# Patient Record
Sex: MALE | Race: WHITE | NOT HISPANIC OR LATINO | Employment: FULL TIME | URBAN - METROPOLITAN AREA
[De-identification: names, ages, dates, MRNs, and addresses within clinical notes are randomized per-mention and may not be internally consistent; named-entity substitution may affect disease eponyms.]

---

## 2017-01-02 ENCOUNTER — OFFICE VISIT (OUTPATIENT)
Dept: DERMATOLOGY | Facility: CLINIC | Age: 22
End: 2017-01-02

## 2017-01-02 VITALS
DIASTOLIC BLOOD PRESSURE: 62 MMHG | BODY MASS INDEX: 22.51 KG/M2 | RESPIRATION RATE: 64 BRPM | WEIGHT: 152 LBS | SYSTOLIC BLOOD PRESSURE: 132 MMHG | HEIGHT: 69 IN

## 2017-01-02 DIAGNOSIS — L70.0 ACNE VULGARIS: ICD-10-CM

## 2017-01-02 DIAGNOSIS — M35.9 AUTOIMMUNE DISEASE (H): ICD-10-CM

## 2017-01-02 DIAGNOSIS — L63.9 AA (ALOPECIA AREATA): Primary | ICD-10-CM

## 2017-01-02 DIAGNOSIS — E55.9 VITAMIN D DEFICIENCY: ICD-10-CM

## 2017-01-02 DIAGNOSIS — L65.9 LOSS OF HAIR: ICD-10-CM

## 2017-01-02 RX ORDER — SULFACETAMIDE SODIUM, SULFUR 100; 50 MG/G; MG/G
LOTION TOPICAL
Qty: 1 BOTTLE | Refills: 11 | Status: SHIPPED | OUTPATIENT
Start: 2017-01-02 | End: 2023-09-19

## 2017-01-02 ASSESSMENT — PAIN SCALES - GENERAL: PAINLEVEL: NO PAIN (0)

## 2017-01-02 NOTE — LETTER
"1/2/2017       RE: Dariusz Marshall  [address] [place] [address]  [place] [address]     Dear Colleague,    Thank you for referring your patient, Dariusz Marshall, to the [facility] DERMATOLOGY at [facility]. Please see a copy of my visit note below.    [facility] Dermatology Note    Dermatology Problem List:  1. Alopecia areata  -current treatment: 3 cc ILK 10mg/cc, alternating clobetasol 0.05% shampoo, and ketoconazole shampoo daily, and Rogaine 5% foam daily  -last labs checked 8/30/16: iron studies wnl (ferritin 56), vitamin D wnl at 33.    2. Acne vulgaris  -current treatment: sulfacetamide/sulfur wash daily, and Tazorac, prn to acneiform papules    3. History of hypovitaminosis D  -vitamin D supplementation 2000 IU daily    Encounter Date: Jan 2, 2017    CC:  Chief Complaint    Patient presents with       Derm Problem        Patient comes to clinic today for hairloss. Small amount of new hair growth. Per pt \"seems worse\"       History of Present Illness:  Mr. Dariusz Marshall is a 21 year old male who presents as a follow-up for alopecia areata. The patient was last seen 10/17/206 when he received 3 cc  Kenalog 10 injected into the scalp. Patient is currently treating his alopecia areata with alternation of  clobetasol 0.05% shampoo and ketoconazole 2% shampoo daily, Rogaine 5% foam daily, and Vitamin D supplementation (2000 IU daily) given history of hypovitaminosis D. Patient had labs checked at his last visit (8/30/16). His iron studies were all within normal limits (ferritin 56), and vitamin D was normal at 33.       Since his last visit, patient reports that his AA has been worsening with regard to the number of alopecic patches, but that he has noticed regrowth of fine hairs in most of the areas, \"there are a lot more spots than last time...it was worse but I think it has been growing in spots.\" He does not think he has any hair loss in his beard (\"my beard is fine\") or " elsewhere on his body. Patient denies any scalp burning, itching, or pain.  No excess hair growth present on unwanted areas of the body.      For acne treatment, patient uses sulfacetamide/sulfur wash daily and Tazorac, prn. He states that his acne is well controlled on his current regime. Patient denies any changes in medications or significant past medical history since his last visit.      Past Medical History:    Patient Active Problem List    Diagnosis       AA (alopecia areata)       Focal hyperhidrosis       Autoimmune disease (H)       Acne vulgaris       Vitamin D deficiency       Past Medical History     History reviewed. No pertinent past medical history.      Past Surgical History     History reviewed. No pertinent past surgical history.       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     Medications:   Current Outpatient Prescriptions     Current Outpatient Prescriptions    Medication  Sig  Dispense  Refill       ketoconazole (NIZORAL) 2 % shampoo  Apply to scalp, lather, leave on for 5 minutes, then rinse.  120 mL  4       clobetasol propionate (CLOBEX) 0.05 % SHAM  Apply to scalp five days of the week  1 Bottle  1       sulfacetamide sodium-sulfur 10-5 % LOTN  Use daily as directed  1 Bottle  11       aluminum chloride (DRYSOL) 20 % external solution  To improve effect, cover area of application with plastic wrap,  hold in place with tight shirt, and wash area in morning. As sweating improves, decrease use to 1-2 times weekly.  60 mL  3       Tazarotene (TAZORAC) 0.05 % CREA  User every other night at first as tolerated and then can advance to daily at night.  30 g  11       Sulfacetamide-Sulfur-Cleanser (ROSADERM) 10-5 % KIT  Externally apply 1 Applicatorful topically daily  1 kit  11       Minoxidil (ROGAINE MENS) 5 % FOAM  Apply to scalp daily after showering  1 Can  11       clindamycin (CLEOCIN T) 1 % lotion  Apply  topically daily. Apply to  face in morning.  60 mL  12         No Known Allergies  Review of Systems:  -As per HPI    Physical exam:  GEN: This is a well developed, well-nourished male in no acute distress, in a pleasant mood.     SKIN: Focused examination of the face, scalp, and fingernails was performed.  -Patches of alopecia with short, fine, hypopigmented fibers present within the circles of alopecia on vertex scalp, occipital scalp, postauricular scalp bilaterally, and bilateral temporoparietal scalp. There is excellent regrowth on the right side of the head with some thinning on the left side, but overall decrease in size of the patches  -Patch of alopecia present on R beard region, minimal hair regrowth in this area  - Scattered healing papules on the bilateral cheeks in the beard region    -Minimal scalp erythema (inter or delmy-follicular) or scale present   -Few scattered acneiform papules on chin and malar cheeks bilaterally  -No patches of hair loss on eyebrows or eyelashes bilaterally    -No evidence of nail pitting or onycholysis present on exam  -No other lesions of concern on areas examined.      Impression/Plan:  1. Alopecia areata: stable since last visit.  Areas of regrowth present along temporoparietal scalp and midline central scalp.  Patches of alopecia persist on occipital, vertex, and L beard region.      Kenalog intralesional injection procedure note: After verbal consent and discussion of risks including but not limited to atrophy, pain, and bruising, cleansing with isopropyl alcohol, time out was performed, 3 total cc of Kenalog 10 mg/cc was injected into approximately 30 sites  on the scalp.  The patient tolerated the procedure well and left the Dermatology clinic in good condition.    Continue alternating clobetasol 0.05% shampoo and ketoconazole 2% shampoo daily    Continue to apply Rogaine 5% foam daily to patches of alopecia    Continue Vit D supplementation 2000 IU daily given history of hypovitaminosis  D    Photographs reviewed       2. Acne vulgaris: clinically improved from previous visits.  Few scattered acneiform papules on chin and malar cheeks bilaterally.    Continue to use sulfacetamide/sulfur wash daily    Continue to apply Tazorac cream, prn to affected areas    Follow-up in 2 months, earlier for new or changing lesions.                CC Dr. Guerra on close of this encounter.    Dr. Madrid staffed the patient.    Staff Involved:  Resident(Amelia Peralta)/Staff(as above)      Patient was seen and examined with the medicine/dermatology resident. I agree with the history, review of systems, physical examination, assessments and plan. ILK injections were completed together.     Nelly Madrid MD  Professor and  Chair  Department of Dermatology  [facility]

## 2017-01-02 NOTE — NURSING NOTE
"  Chief Complaint   Patient presents with     Hair/Scalp Problem     Alopecia.Small amount of new hair growth. Per pt \"seems worse\"          Hazel Smith RN     "

## 2017-01-02 NOTE — PATIENT INSTRUCTIONS
Dr. Madrid's Clinic Follow-up:    If we are unable to schedule your appointment today, then you will receive a personal call from our clinic staff to schedule with Dr. Madrid prior to your expected return.    Please, contact us with any questions via telephone.  We are not using [employer] to schedule appointments for this clinic at this the present time    How do I call with questions?       [facility]: [phone]       For urgent needs outside of business hours call the [facility] at [phone]        and ask for the dermatology resident on call

## 2017-01-02 NOTE — MR AVS SNAPSHOT
After Visit Summary   1/2/2017    Dariusz Marshall    MRN: [id number]           Patient Information     Date Of Birth          1995        Visit Information        Provider Department      1/2/2017 7:30 AM Nelly Madrid MD [facility] Dermatology        Today's Diagnoses     AA (alopecia areata)    -  1     Acne vulgaris         Loss of hair         Autoimmune disease (H)           Care Instructions    Dr. Madrid's Clinic Follow-up:    If we are unable to schedule your appointment today, then you will receive a personal call from our clinic staff to schedule with Dr. Madrid prior to your expected return.    Please, contact us with any questions via telephone.  We are not using [employer] to schedule appointments for this clinic at this the present time    How do I call with questions?       [facility]: [phone]       For urgent needs outside of business hours call the [facility] at [phone]        and ask for the dermatology resident on call            Follow-ups after your visit        Follow-up notes from your care team     Return in about 2 months (around 3/2/2017) for Alopecia areata f/u.      Who to contact     Please call your clinic at [phone] to:    Ask questions about your health    Make or cancel appointments    Discuss your medicines    Learn about your test results    Speak to your doctor   If you have compliments or concerns about an experience at your clinic, or if you wish to file a complaint, please contact [facility] Patient Relations at [phone] or email us at [email or web address]         Additional Information About Your Visit        [employer] Information     [employer] gives you secure access to your electronic health record. If you see a primary care provider, you can also send messages to your care team and make appointments. If you have questions, please call your primary care  "clinic.  If you do not have a primary care provider, please call [phone] and they will assist you.      [employer] is an electronic gateway that provides easy, online access to your medical records. With [employer], you can request a clinic appointment, read your test results, renew a prescription or communicate with your care team.     To access your existing account, please contact your [facility] or call [phone] for assistance.        Your Vitals Were     Respirations Height BMI (Body Mass Index)             64 1.76 m (5' 9.29\") 22.26 kg/m2          Blood Pressure from Last 3 Encounters:   01/02/17 132/62   05/23/16 137/85   04/10/14 119/67    Weight from Last 3 Encounters:   01/02/17 68.947 kg (152 lb)   04/10/14 70.3 kg (154 lb 15.7 oz) (53.73 %*)   08/26/13 69 kg (152 lb 1.9 oz) (53.14 %*)     * Growth percentiles are based on [facility] 2-20 Years data.              Today, you had the following     No orders found for display       Primary Care Provider Office Phone # Fax #    Justin Guerra [phone]       [facility] [address] [place] [id number]        Thank you!     Thank you for choosing [facility] DERMATOLOGY  for your care. Our goal is always to provide you with excellent care. Hearing back from our patients is one way we can continue to improve our services. Please take a few minutes to complete the written survey that you may receive in the mail after your visit with us. Thank you!             Your Updated Medication List - Protect others around you: Learn how to safely use, store and throw away your medicines at www.disposemymeds.org.          This list is accurate as of: 1/2/17  8:08 AM.  Always use your most recent med list.                   Brand Name Dispense Instructions for use    aluminum chloride 20 % external solution    DRYSOL    60 mL    To improve effect, cover area of application with plastic wrap,  hold in place with tight shirt, " and wash area in morning. As sweating improves, decrease use to 1-2 times weekly.       clindamycin 1 % lotion    CLEOCIN T    60 mL    Apply  topically daily. Apply to face in morning.       clobetasol propionate 0.05 % Sham    CLOBEX    1 Bottle    Apply to scalp five days of the week       ketoconazole 2 % shampoo    NIZORAL    120 mL    Apply to scalp, lather, leave on for 5 minutes, then rinse.       Minoxidil 5 % Foam    ROGAINE MENS    1 Can    Apply to scalp daily after showering       ROSADERM 10-5 % Kit     1 kit    Externally apply 1 Applicatorful topically daily       sulfacetamide sodium-sulfur 10-5 % Lotn     1 Bottle    Use daily as directed       tazarotene 0.05 % Crea cream    TAZORAC    30 g    User every other night at first as tolerated and then can advance to daily at night.

## 2017-01-02 NOTE — PROGRESS NOTES
"[facility] Dermatology Note    Dermatology Problem List:  1. Alopecia areata  -current treatment: 3 cc ILK 10mg/cc, alternating clobetasol 0.05% shampoo, and ketoconazole shampoo daily, and Rogaine 5% foam daily  -last labs checked 8/30/16: iron studies wnl (ferritin 56), vitamin D wnl at 33.    2. Acne vulgaris  -current treatment: sulfacetamide/sulfur wash daily, and Tazorac, prn to acneiform papules    3. History of hypovitaminosis D  -vitamin D supplementation 2000 IU daily    Encounter Date: Jan 2, 2017    CC:  Chief Complaint    Patient presents with       Derm Problem        Patient comes to clinic today for hairloss. Small amount of new hair growth. Per pt \"seems worse\"       History of Present Illness:  Mr. Dariusz Marshall is a 21 year old male who presents as a follow-up for alopecia areata. The patient was last seen 10/17/206 when he received 3 cc  Kenalog 10 injected into the scalp. Patient is currently treating his alopecia areata with alternation of  clobetasol 0.05% shampoo and ketoconazole 2% shampoo daily, Rogaine 5% foam daily, and Vitamin D supplementation (2000 IU daily) given history of hypovitaminosis D. Patient had labs checked at his last visit (8/30/16). His iron studies were all within normal limits (ferritin 56), and vitamin D was normal at 33.       Since his last visit, patient reports that his AA has been worsening with regard to the number of alopecic patches, but that he has noticed regrowth of fine hairs in most of the areas, \"there are a lot more spots than last time...it was worse but I think it has been growing in spots.\" He does not think he has any hair loss in his beard (\"my beard is fine\") or elsewhere on his body. Patient denies any scalp burning, itching, or pain.  No excess hair growth present on unwanted areas of the body.      For acne treatment, patient uses sulfacetamide/sulfur wash daily and Tazorac, prn. He states that his acne is well controlled on his " current regime. Patient denies any changes in medications or significant past medical history since his last visit.      Past Medical History:    Patient Active Problem List    Diagnosis       AA (alopecia areata)       Focal hyperhidrosis       Autoimmune disease (H)       Acne vulgaris       Vitamin D deficiency       Past Medical History     History reviewed. No pertinent past medical history.      Past Surgical History     History reviewed. No pertinent past surgical history.       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     Medications:   Current Outpatient Prescriptions     Current Outpatient Prescriptions    Medication  Sig  Dispense  Refill       ketoconazole (NIZORAL) 2 % shampoo  Apply to scalp, lather, leave on for 5 minutes, then rinse.  120 mL  4       clobetasol propionate (CLOBEX) 0.05 % SHAM  Apply to scalp five days of the week  1 Bottle  1       sulfacetamide sodium-sulfur 10-5 % LOTN  Use daily as directed  1 Bottle  11       aluminum chloride (DRYSOL) 20 % external solution  To improve effect, cover area of application with plastic wrap,  hold in place with tight shirt, and wash area in morning. As sweating improves, decrease use to 1-2 times weekly.  60 mL  3       Tazarotene (TAZORAC) 0.05 % CREA  User every other night at first as tolerated and then can advance to daily at night.  30 g  11       Sulfacetamide-Sulfur-Cleanser (ROSADERM) 10-5 % KIT  Externally apply 1 Applicatorful topically daily  1 kit  11       Minoxidil (ROGAINE MENS) 5 % FOAM  Apply to scalp daily after showering  1 Can  11       clindamycin (CLEOCIN T) 1 % lotion  Apply  topically daily. Apply to face in morning.  60 mL  12         No Known Allergies  Review of Systems:  -As per HPI    Physical exam:  GEN: This is a well developed, well-nourished male in no acute distress, in a pleasant mood.     SKIN: Focused examination of the face, scalp, and fingernails was  performed.  -Patches of alopecia with short, fine, hypopigmented fibers present within the circles of alopecia on vertex scalp, occipital scalp, postauricular scalp bilaterally, and bilateral temporoparietal scalp. There is excellent regrowth on the right side of the head with some thinning on the left side, but overall decrease in size of the patches  -Patch of alopecia present on R beard region, minimal hair regrowth in this area  - Scattered healing papules on the bilateral cheeks in the beard region    -Minimal scalp erythema (inter or delmy-follicular) or scale present   -Few scattered acneiform papules on chin and malar cheeks bilaterally  -No patches of hair loss on eyebrows or eyelashes bilaterally    -No evidence of nail pitting or onycholysis present on exam  -No other lesions of concern on areas examined.      Impression/Plan:  1. Alopecia areata: stable since last visit.  Areas of regrowth present along temporoparietal scalp and midline central scalp.  Patches of alopecia persist on occipital, vertex, and L beard region.      Kenalog intralesional injection procedure note: After verbal consent and discussion of risks including but not limited to atrophy, pain, and bruising, cleansing with isopropyl alcohol, time out was performed, 3 total cc of Kenalog 10 mg/cc was injected into approximately 30 sites  on the scalp.  The patient tolerated the procedure well and left the Dermatology clinic in good condition.    Continue alternating clobetasol 0.05% shampoo and ketoconazole 2% shampoo daily    Continue to apply Rogaine 5% foam daily to patches of alopecia    Continue Vit D supplementation 2000 IU daily given history of hypovitaminosis D    Photographs reviewed       2. Acne vulgaris: clinically improved from previous visits.  Few scattered acneiform papules on chin and malar cheeks bilaterally.    Continue to use sulfacetamide/sulfur wash daily    Continue to apply Tazorac cream, prn to affected  areas    Follow-up in 2 months, earlier for new or changing lesions.                CC Dr. Guerra on close of this encounter.    Dr. Madrid staffed the patient.    Staff Involved:  Resident(Amelia Peralta)/Staff(as above)      Patient was seen and examined with the medicine/dermatology resident. I agree with the history, review of systems, physical examination, assessments and plan. ILK injections were completed together.     Nelly Madrid MD  Professor and  Chair  Department of Dermatology  [facility]

## 2017-01-02 NOTE — NURSING NOTE
Drug Administration Record    Drug Name: triamcinolone acetonide(kenalog)  Dose: 3mL of triamcinolone 10mg/mL, 30mg dose  Route administered: ID  NDC #: Kenalog-10 ([address]-89)  Amount of waste(mL):2mL  Reason for waste: As per MD

## 2017-01-24 DIAGNOSIS — L63.9 AA (ALOPECIA AREATA): Primary | ICD-10-CM

## 2017-01-24 RX ORDER — CLOBETASOL PROPIONATE 0.05 G/100ML
SHAMPOO TOPICAL
Qty: 118 ML | Refills: 3 | Status: SHIPPED | OUTPATIENT
Start: 2017-01-24 | End: 2023-09-16

## 2017-01-24 NOTE — TELEPHONE ENCOUNTER
Last seen 1/2/17:  1. Alopecia areata: stable since last visit.  Areas of regrowth present along temporoparietal scalp and midline central scalp.  Patches of alopecia persist on occipital, vertex, and L beard region.      Kenalog intralesional injection procedure note: After verbal consent and discussion of risks including but not limited to atrophy, pain, and bruising, cleansing with isopropyl alcohol, time out was performed, 3 total cc of Kenalog 10 mg/cc was injected into approximately 30 sites  on the scalp.  The patient tolerated the procedure well and left the Dermatology clinic in good condition.    Continue alternating clobetasol 0.05% shampoo and ketoconazole 2% shampoo daily    Continue to apply Rogaine 5% foam daily to patches of alopecia    Continue Vit D supplementation 2000 IU daily given history of hypovitaminosis D    Photographs reviewed      2. Acne vulgaris: clinically improved from previous visits.  Few scattered acneiform papules on chin and malar cheeks bilaterally.    Continue to use sulfacetamide/sulfur wash daily    Continue to apply Tazorac cream, prn to affected areas    Follow-up in 2 months, earlier for new or changing lesions.

## 2017-01-24 NOTE — TELEPHONE ENCOUNTER
Received refill request for clobetasol 0.05% shampoo.  Patient last seen 1/2/17 and instructed to continue use.  Refill appropriate.    Karie Scott MD  PGY-3, Dermatology  Pager [phone]

## 2017-03-31 ENCOUNTER — OFFICE VISIT (OUTPATIENT)
Dept: DERMATOLOGY | Facility: CLINIC | Age: 22
End: 2017-03-31

## 2017-03-31 VITALS — HEART RATE: 74 BPM | SYSTOLIC BLOOD PRESSURE: 136 MMHG | DIASTOLIC BLOOD PRESSURE: 78 MMHG

## 2017-03-31 DIAGNOSIS — M35.9 AUTOIMMUNE DISEASE (H): ICD-10-CM

## 2017-03-31 DIAGNOSIS — E55.9 VITAMIN D DEFICIENCY: ICD-10-CM

## 2017-03-31 DIAGNOSIS — L70.0 ACNE VULGARIS: ICD-10-CM

## 2017-03-31 DIAGNOSIS — L63.9 AA (ALOPECIA AREATA): Primary | ICD-10-CM

## 2017-03-31 RX ORDER — BIOTIN 10 MG
10000 TABLET ORAL DAILY
COMMUNITY
End: 2023-09-16

## 2017-03-31 ASSESSMENT — PAIN SCALES - GENERAL: PAINLEVEL: NO PAIN (0)

## 2017-03-31 NOTE — NURSING NOTE
"Dermatology Rooming Note    Dariusz Marshall's goals for this visit include:   Chief Complaint   Patient presents with     Derm Problem     Patient comes to clinic today for hairloss. States \"I have a lot of regrowth.\"     Mable Simon, [facility]    "
5

## 2017-03-31 NOTE — MR AVS SNAPSHOT
After Visit Summary   3/31/2017    Dariusz Marshall    MRN: [id number]           Patient Information     Date Of Birth          1995        Visit Information        Provider Department      3/31/2017 10:15 AM Nelly Madrid MD [facility] Dermatology        Today's Diagnoses     AA (alopecia areata)    -  1    Acne vulgaris        Vitamin D deficiency        Autoimmune disease (H)           Follow-ups after your visit        Your next 10 appointments already scheduled     Jun 19, 2017 12:45 PM CDT   (Arrive by 12:30 PM)   RETURN HAIRLOSS with Nelly Madrid MD   [facility] Dermatology ([facility])    [address] [facility] [address]  [address] [facility] [address] [phone]              Who to contact     Please call your clinic at [phone] to:    Ask questions about your health    Make or cancel appointments    Discuss your medicines    Learn about your test results    Speak to your doctor   If you have compliments or concerns about an experience at your clinic, or if you wish to file a complaint, please contact [facility] Patient Relations at [phone] or email us at [email or web address]         Additional Information About Your Visit        MyChart Information     [employer] gives you secure access to your electronic health record. If you see a primary care provider, you can also send messages to your care team and make appointments. If you have questions, please call your primary care clinic.  If you do not have a primary care provider, please call [phone] and they will assist you.      G[employer] is an electronic gateway that provides easy, online access to your medical records. With [employer]se, you can request a clinic appointment, read your test results, renew a prescription or communicate with your care team.     To access your existing account, please contact your [facility] or  call [phone] for assistance.        Care EveryWhere ID     This is your Care EveryWhere ID. This could be used by other organizations to access your [facility] medical records  [id number]        Your Vitals Were     Pulse                   74            Blood Pressure from Last 3 Encounters:   03/31/17 136/78   01/02/17 132/62   05/23/16 137/85    Weight from Last 3 Encounters:   01/02/17 68.9 kg (152 lb)   04/10/14 70.3 kg (154 lb 15.7 oz) (54 %)*   08/26/13 69 kg (152 lb 1.9 oz) (53 %)*     * Growth percentiles are based on [facility] 2-20 Years data.              We Performed the Following     INJECTION INTO SKIN LESIONS >7          Today's Medication Changes          These changes are accurate as of: 3/31/17 11:59 PM.  If you have any questions, ask your nurse or doctor.               Start taking these medicines.        Dose/Directions    triamcinolone acetonide 10 MG/ML injection   Commonly known as:  KENALOG   Used for:  AA (alopecia areata)   Started by:  Nelly Madrid MD        See med note   Quantity:  5 mL   Refills:  0            Where to get your medicines      Some of these will need a paper prescription and others can be bought over the counter.  Ask your nurse if you have questions.     You don't need a prescription for these medications     triamcinolone acetonide 10 MG/ML injection                Primary Care Provider Office Phone # Fax #    Justin Guerra [phone]       [place] [address] [address] [place] [phone]        Thank you!     Thank you for choosing [facility] DERMATOLOGY  for your care. Our goal is always to provide you with excellent care. Hearing back from our patients is one way we can continue to improve our services. Please take a few minutes to complete the written survey that you may receive in the mail after your visit with us. Thank you!             Your Updated Medication List - Protect others around you: Learn how to safely use, store  and throw away your medicines at www.disposemymeds.org.          This list is accurate as of: 3/31/17 11:59 PM.  Always use your most recent med list.                   Brand Name Dispense Instructions for use    aluminum chloride 20 % external solution    DRYSOL    60 mL    To improve effect, cover area of application with plastic wrap,  hold in place with tight shirt, and wash area in morning. As sweating improves, decrease use to 1-2 times weekly.       BIOTIN MAXIMUM STRENGTH 10 MG Tabs tablet   Generic drug:  Biotin      Take 10,000 mcg by mouth daily       clindamycin 1 % lotion    CLEOCIN T    60 mL    Apply  topically daily. Apply to face in morning.       clobetasol propionate 0.05 % Sham     118 mL    Apply to dry scalp, leave for ten minutes, then rinse out.  Alternate use with ketoconazole shampoo.       ketoconazole 2 % shampoo    NIZORAL    120 mL    Apply to scalp, lather, leave on for 5 minutes, then rinse.       Minoxidil 5 % Foam    ROGAINE MENS    1 Can    Apply to scalp daily after showering       MULTIVITAMIN & MINERAL PO      Take 1 tablet by mouth daily       ROSADERM 10-5 % Kit     1 kit    Externally apply 1 Applicatorful topically daily       sulfacetamide sodium-sulfur 10-5 % Lotn     1 Bottle    Use daily as directed       tazarotene 0.05 % Crea cream    TAZORAC    30 g    User every other night at first as tolerated and then can advance to daily at night.       triamcinolone acetonide 10 MG/ML injection    KENALOG    5 mL    See med note

## 2017-03-31 NOTE — LETTER
"3/31/2017       RE: Dariusz Marshall  [address] [place] [address]  [place] [address]     Dear Colleague,    Thank you for referring your patient, Dariusz Marshall, to the [facility] DERMATOLOGY at [facility]. Please see a copy of my visit note below.    [facility] Dermatology Note    Dermatology Problem List:  1. Alopecia areata, persistent patchy  -current treatment: 3 cc ILK 10 mg/cc today (3/31/17), clobetasol 0.05% shampoo once per week, and ketoconazole 2% shampoo 5-6 days per week, and Rogaine 5% foam daily   -labs last checked 8/30/16, which included iron profile and Vit D which were all wnl    2.Acne vulgaris, well-controlled.  -current treatment: sulfacetamide-sulfur wash daily, and Tazorac 0.05% cream prn    3. Hypovitaminosis D   -current treatment: 2000 IU daily  -most recent Vit D checked 8/30/16 was 33    Encounter Date: Mar 31, 2017    CC:  Chief Complaint   Patient presents with     Derm Problem     Patient comes to clinic today for hairloss. States \"I have a lot of regrowth.\"     History of Present Illness:  Mr. Dariusz Marshall is a 22 year old male who presents with his mom as a follow-up for patchy persistent alopecia areata. The patient was last seen 1/2/17 when he received 3 cc ILK 10 mg/cc injected into his scalp.  He is currently treating scalp with alternating clobetasol 0.05% shampoo and ketoconazole 2% shampoo daily.  He also applies Rogaine 5% foam to his scalp, and spot-treats patches of alopecia on his beard with the foam as well. Dariusz continues to take Vit D supplements  2000 IU daily.    Since his last visit, Dariusz states that he has noticed improved hair regrowth. However, the new regrowth that is occurring is blonde compared to his normal dark brown hair color. He denies scalp symptoms such as pain, burning or pruritus.  Aside from a new patch of alopecia that developed on his R lower chin, he denies any new patches of hair loss.  He believes the " thinnest area of regrowth is along the bilateral post-auricular scalp.  No changes in medications or past medical history since his last visit.     Past Medical History:   Patient Active Problem List   Diagnosis     AA (alopecia areata)     Focal hyperhidrosis     Autoimmune disease (H)     Acne vulgaris     Vitamin D deficiency     History reviewed. No pertinent past medical history.  History reviewed. No pertinent surgical history.    Social History:  The patient was recently accepted into [employer], and plans to start this fall.  He is originally from [place], [place].    Family History:  Not addressed at today's visit.    Medications:  Current Outpatient Prescriptions   Medication Sig Dispense Refill     Biotin (BIOTIN MAXIMUM STRENGTH) 10 MG TABS tablet Take 10,000 mcg by mouth daily       Multiple Vitamins-Minerals (MULTIVITAMIN & MINERAL PO) Take 1 tablet by mouth daily       clobetasol propionate 0.05 % SHAM Apply to dry scalp, leave for ten minutes, then rinse out.  Alternate use with ketoconazole shampoo. 118 mL 3     ketoconazole (NIZORAL) 2 % shampoo Apply to scalp, lather, leave on for 5 minutes, then rinse. 120 mL 4     Tazarotene (TAZORAC) 0.05 % CREA User every other night at first as tolerated and then can advance to daily at night. 30 g 11     Sulfacetamide-Sulfur-Cleanser (ROSADERM) 10-5 % KIT Externally apply 1 Applicatorful topically daily 1 kit 11     Minoxidil (ROGAINE MENS) 5 % FOAM Apply to scalp daily after showering 1 Can 11     sulfacetamide sodium-sulfur 10-5 % LOTN Use daily as directed (Patient not taking: Reported on 3/31/2017) 1 Bottle 11     aluminum chloride (DRYSOL) 20 % external solution To improve effect, cover area of application with plastic wrap,  hold in place with tight shirt, and wash area in morning. As sweating improves, decrease use to 1-2 times weekly. (Patient not taking: Reported on 3/31/2017) 60 mL 3     clindamycin (CLEOCIN T) 1 % lotion Apply   topically daily. Apply to face in morning. (Patient not taking: Reported on 3/31/2017) 60 mL 12     No Known Allergies    Review of Systems:  -Constitutional: The patient denies fatigue, fevers, chills, unintended weight loss, and night sweats.  -HEENT: Patient denies nonhealing oral sores.  -Skin: As above in HPI. No additional skin concerns.    Physical exam:  Vitals: /78 (BP Location: Right arm, Patient Position: Chair, Cuff Size: Adult Regular)  Pulse 74  GEN: This is a well developed, well-nourished male in no acute distress, in a pleasant mood.    SKIN: Focused examination of the face, scalp, and fingernails was performed.  -diffuse, greasy scale with scattered erythematous papules present along scalp with mild follicular accentuation  -Scattered patches of new regrowth with lightly pigmented blonde terminal and indeterminate fibers present throughout scalp  -patches darkly pigmented terminal fibers intermixed with patches of new blonde fibers on scalp  -no hair loss present on eyebrows or eyelashes  -patch of alopecia present on R beard, new patch on R lower chin  -no nail pitting or onycholysis present   -scattered acneiform papules present on bilateral cheeks and upper L forehead.  -No other lesions of concern on areas examined.     Impression/Plan:  Alopecia areata, patchy persistent subtype. Acne vulgaris and history of low vitamin D level: Less than 5% of the scalp contains absolutely no hair fibers present.  Patches of new regrowth present throughout the scalp are coming in blonde, whereas previous areas of regrowth are dark brown (which is his natural hair color).  One new patch of alopecia is present on his R lower chin.  Diffuse seborrhea and grease present along scalp.    Kenalog intralesional injection procedure note (performed by faculty with medical student assistance): After verbal consent and discussion of risks including but not limited to atrophy, pain, and bruising, cleansing with  isopropyl alcohol, time out was performed, 3 total cc of Kenalog 10 mg/cc was injected approximately 30 sites on the scalp.  The patient tolerated the procedure well and left the Dermatology clinic in good condition.    Decrease use of clobetasol 0.05% shampoo to once per week    Increase use of ketoconazole 2% shampoo to 5-6x per week given increased seborrhea present on scalp.    Continue Vit D supplementation 2000 IU daily.  Last labs checked 8/30/16 showed an increasing Vit D level at 33 (was 13 on 3/15/16).    Continue to apply Rogaine 5% foam daily and scalp, and patches in beard daily as needed.    Dariusz and his mom were given information on new treatments on the horizon regarding both topical and oral LAWRENCE inhibitors    Photographs taken for future reference    Follow-up in 4 months, earlier for new or changing lesions.     Staff Involved:  Scribed by Suyapa Carolina, MS4 for Dr. Madrid.      I agree with the PFSH and ROS as completed by the Medical Student. The remainder of the encounter was performed by me and scribed by the Medical Student. The scribed note accurately reflects my personal services and the medical decisions made by me. ILK injections were done together.      Nelly Madrid MD  Professor and Chair  Department of Dermatology  [facility]                  Pictures taken of patient today to be placed in chart for future reference.        Again, thank you for allowing me to participate in the care of your patient.      Sincerely,    Nelly Madrid MD

## 2017-03-31 NOTE — PROGRESS NOTES
"[facility] Dermatology Note    Dermatology Problem List:  1. Alopecia areata, persistent patchy  -current treatment: 3 cc ILK 10 mg/cc today (3/31/17), clobetasol 0.05% shampoo once per week, and ketoconazole 2% shampoo 5-6 days per week, and Rogaine 5% foam daily   -labs last checked 8/30/16, which included iron profile and Vit D which were all wnl    2.Acne vulgaris, well-controlled.  -current treatment: sulfacetamide-sulfur wash daily, and Tazorac 0.05% cream prn    3. Hypovitaminosis D   -current treatment: 2000 IU daily  -most recent Vit D checked 8/30/16 was 33    Encounter Date: Mar 31, 2017    CC:  Chief Complaint   Patient presents with     Derm Problem     Patient comes to clinic today for hairloss. States \"I have a lot of regrowth.\"     History of Present Illness:  Mr. Dariusz Marshall is a 22 year old male who presents with his mom as a follow-up for patchy persistent alopecia areata. The patient was last seen 1/2/17 when he received 3 cc ILK 10 mg/cc injected into his scalp.  He is currently treating scalp with alternating clobetasol 0.05% shampoo and ketoconazole 2% shampoo daily.  He also applies Rogaine 5% foam to his scalp, and spot-treats patches of alopecia on his beard with the foam as well. Dariusz continues to take Vit D supplements  2000 IU daily.    Since his last visit, Dariusz states that he has noticed improved hair regrowth. However, the new regrowth that is occurring is blonde compared to his normal dark brown hair color. He denies scalp symptoms such as pain, burning or pruritus.  Aside from a new patch of alopecia that developed on his R lower chin, he denies any new patches of hair loss.  He believes the thinnest area of regrowth is along the bilateral post-auricular scalp.  No changes in medications or past medical history since his last visit.     Past Medical History:   Patient Active Problem List   Diagnosis     AA (alopecia areata)     Focal hyperhidrosis     Autoimmune " disease (H)     Acne vulgaris     Vitamin D deficiency     History reviewed. No pertinent past medical history.  History reviewed. No pertinent surgical history.    Social History:  The patient was recently accepted into [employer], and plans to start this fall.  He is originally from [place], [place].    Family History:  Not addressed at today's visit.    Medications:  Current Outpatient Prescriptions   Medication Sig Dispense Refill     Biotin (BIOTIN MAXIMUM STRENGTH) 10 MG TABS tablet Take 10,000 mcg by mouth daily       Multiple Vitamins-Minerals (MULTIVITAMIN & MINERAL PO) Take 1 tablet by mouth daily       clobetasol propionate 0.05 % SHAM Apply to dry scalp, leave for ten minutes, then rinse out.  Alternate use with ketoconazole shampoo. 118 mL 3     ketoconazole (NIZORAL) 2 % shampoo Apply to scalp, lather, leave on for 5 minutes, then rinse. 120 mL 4     Tazarotene (TAZORAC) 0.05 % CREA User every other night at first as tolerated and then can advance to daily at night. 30 g 11     Sulfacetamide-Sulfur-Cleanser (ROSADERM) 10-5 % KIT Externally apply 1 Applicatorful topically daily 1 kit 11     Minoxidil (ROGAINE MENS) 5 % FOAM Apply to scalp daily after showering 1 Can 11     sulfacetamide sodium-sulfur 10-5 % LOTN Use daily as directed (Patient not taking: Reported on 3/31/2017) 1 Bottle 11     aluminum chloride (DRYSOL) 20 % external solution To improve effect, cover area of application with plastic wrap,  hold in place with tight shirt, and wash area in morning. As sweating improves, decrease use to 1-2 times weekly. (Patient not taking: Reported on 3/31/2017) 60 mL 3     clindamycin (CLEOCIN T) 1 % lotion Apply  topically daily. Apply to face in morning. (Patient not taking: Reported on 3/31/2017) 60 mL 12     No Known Allergies    Review of Systems:  -Constitutional: The patient denies fatigue, fevers, chills, unintended weight loss, and night sweats.  -HEENT: Patient denies nonhealing  oral sores.  -Skin: As above in HPI. No additional skin concerns.    Physical exam:  Vitals: /78 (BP Location: Right arm, Patient Position: Chair, Cuff Size: Adult Regular)  Pulse 74  GEN: This is a well developed, well-nourished male in no acute distress, in a pleasant mood.    SKIN: Focused examination of the face, scalp, and fingernails was performed.  -diffuse, greasy scale with scattered erythematous papules present along scalp with mild follicular accentuation  -Scattered patches of new regrowth with lightly pigmented blonde terminal and indeterminate fibers present throughout scalp  -patches darkly pigmented terminal fibers intermixed with patches of new blonde fibers on scalp  -no hair loss present on eyebrows or eyelashes  -patch of alopecia present on R beard, new patch on R lower chin  -no nail pitting or onycholysis present   -scattered acneiform papules present on bilateral cheeks and upper L forehead.  -No other lesions of concern on areas examined.     Impression/Plan:  Alopecia areata, patchy persistent subtype. Acne vulgaris and history of low vitamin D level: Less than 5% of the scalp contains absolutely no hair fibers present.  Patches of new regrowth present throughout the scalp are coming in blonde, whereas previous areas of regrowth are dark brown (which is his natural hair color).  One new patch of alopecia is present on his R lower chin.  Diffuse seborrhea and grease present along scalp.    Kenalog intralesional injection procedure note (performed by faculty with medical student assistance): After verbal consent and discussion of risks including but not limited to atrophy, pain, and bruising, cleansing with isopropyl alcohol, time out was performed, 3 total cc of Kenalog 10 mg/cc was injected approximately 30 sites on the scalp.  The patient tolerated the procedure well and left the Dermatology clinic in good condition.    Decrease use of clobetasol 0.05% shampoo to once per  week    Increase use of ketoconazole 2% shampoo to 5-6x per week given increased seborrhea present on scalp.    Continue Vit D supplementation 2000 IU daily.  Last labs checked 8/30/16 showed an increasing Vit D level at 33 (was 13 on 3/15/16).    Continue to apply Rogaine 5% foam daily and scalp, and patches in beard daily as needed.    Dariusz and his mom were given information on new treatments on the horizon regarding both topical and oral LAWRENCE inhibitors    Photographs taken for future reference    Follow-up in 4 months, earlier for new or changing lesions.     Staff Involved:  Scribed by Suyapa Carolina, MS4 for Dr. Madrid.      I agree with the PFSH and ROS as completed by the Medical Student. The remainder of the encounter was performed by me and scribed by the Medical Student. The scribed note accurately reflects my personal services and the medical decisions made by me. ILK injections were done together.      Nelly Madrid MD  Professor and Chair  Department of Dermatology  [facility]

## 2017-06-19 ENCOUNTER — OFFICE VISIT (OUTPATIENT)
Dept: DERMATOLOGY | Facility: CLINIC | Age: 22
End: 2017-06-19

## 2017-06-19 VITALS — DIASTOLIC BLOOD PRESSURE: 80 MMHG | HEART RATE: 105 BPM | SYSTOLIC BLOOD PRESSURE: 142 MMHG

## 2017-06-19 DIAGNOSIS — L63.9 AA (ALOPECIA AREATA): Primary | ICD-10-CM

## 2017-06-19 DIAGNOSIS — M35.9 AUTOIMMUNE DISEASE (H): ICD-10-CM

## 2017-06-19 DIAGNOSIS — L70.0 ACNE VULGARIS: ICD-10-CM

## 2017-06-19 ASSESSMENT — PAIN SCALES - GENERAL: PAINLEVEL: NO PAIN (0)

## 2017-06-19 NOTE — MR AVS SNAPSHOT
After Visit Summary   6/19/2017    Dariusz Marshall    MRN: [id number]           Patient Information     Date Of Birth          1995        Visit Information        Provider Department      6/19/2017 12:45 PM Nelly Madrid MD [facility]        Today's Diagnoses     AA (alopecia areata)    -  1    Acne vulgaris        Autoimmune disease (H)          Care Instructions    Can take a break from using the Vitamin D. Maybe restart the Vitamin D this November.           Follow-ups after your visit        Follow-up notes from your care team     Return in about 3 months (around 9/19/2017).      Who to contact     Please call your clinic at [phone] to:    Ask questions about your health    Make or cancel appointments    Discuss your medicines    Learn about your test results    Speak to your doctor   If you have compliments or concerns about an experience at your clinic, or if you wish to file a complaint, please contact [facility] Patient Relations at [phone] or email us at [email or web address]         Additional Information About Your Visit        MyChart Information     [employer] gives you secure access to your electronic health record. If you see a primary care provider, you can also send messages to your care team and make appointments. If you have questions, please call your primary care clinic.  If you do not have a primary care provider, please call [phone] and they will assist you.      [employer] is an electronic gateway that provides easy, online access to your medical records. With [employer], you can request a clinic appointment, read your test results, renew a prescription or communicate with your care team.     To access your existing account, please contact your [facility] or call [phone] for assistance.        Care EveryWhere ID     This is your Care EveryWhere ID. This could be used by other  organizations to access your [place] medical records  [id number]        Your Vitals Were     Pulse                   105            Blood Pressure from Last 3 Encounters:   06/19/17 142/80   03/31/17 136/78   01/02/17 132/62    Weight from Last 3 Encounters:   01/02/17 68.9 kg (152 lb)   04/10/14 70.3 kg (154 lb 15.7 oz) (54 %)*   08/26/13 69 kg (152 lb 1.9 oz) (53 %)*     * Growth percentiles are based on [facility] 2-20 Years data.              We Performed the Following     INJECTION INTO SKIN LESIONS >7          Today's Medication Changes          These changes are accurate as of: 6/19/17 11:59 PM.  If you have any questions, ask your nurse or doctor.               Start taking these medicines.        Dose/Directions    triamcinolone acetonide 10 MG/ML injection   Commonly known as:  KENALOG   Used for:  AA (alopecia areata)   Started by:  Nelly Madrid MD        See med note   Quantity:  5 mL   Refills:  0            Where to get your medicines      Some of these will need a paper prescription and others can be bought over the counter.  Ask your nurse if you have questions.     You don't need a prescription for these medications     triamcinolone acetonide 10 MG/ML injection                Primary Care Provider Office Phone # Fax #    Justin Guerra [phone]       [facility] [address] [place] [id number]        Equal Access to Services     [facility] AH: Hadii kavita martin hadasho Sotip, waaxda luqadaha, qaybta kaalmada sheilayada, anival boland . So [facility] [phone].    ATENCIÓN: Si habla español, tiene a adams disposición servicios gratuitos de asistencia lingüística. Llame al [phone].    We comply with applicable federal civil rights laws and [place] laws. We do not discriminate on the basis of race, color, national origin, age, disability sex, sexual orientation or gender identity.            Thank you!     Thank you for choosing [facility]  for your care. Our goal is always to provide you with excellent care. Hearing back from our patients is one way we can continue to improve our services. Please take a few minutes to complete the written survey that you may receive in the mail after your visit with us. Thank you!             Your Updated Medication List - Protect others around you: Learn how to safely use, store and throw away your medicines at www.disposemymeds.org.          This list is accurate as of: 6/19/17 11:59 PM.  Always use your most recent med list.                   Brand Name Dispense Instructions for use Diagnosis    aluminum chloride 20 % external solution    DRYSOL    60 mL    To improve effect, cover area of application with plastic wrap,  hold in place with tight shirt, and wash area in morning. As sweating improves, decrease use to 1-2 times weekly.    Focal hyperhidrosis       BIOTIN MAXIMUM STRENGTH 10 MG Tabs tablet   Generic drug:  Biotin      Take 10,000 mcg by mouth daily        clindamycin 1 % lotion    CLEOCIN T    60 mL    Apply  topically daily. Apply to face in morning.    Acne       clobetasol propionate 0.05 % Sham     118 mL    Apply to dry scalp, leave for ten minutes, then rinse out.  Alternate use with ketoconazole shampoo.    AA (alopecia areata)       ketoconazole 2 % shampoo    NIZORAL    120 mL    Apply to scalp, lather, leave on for 5 minutes, then rinse.    AA (alopecia areata)       Minoxidil 5 % Foam    ROGAINE MENS    1 Can    Apply to scalp daily after showering    AA (alopecia areata)       MULTIVITAMIN & MINERAL PO      Take 1 tablet by mouth daily        ROSADERM 10-5 % Kit     1 kit    Externally apply 1 Applicatorful topically daily    Acne       sulfacetamide sodium-sulfur 10-5 % Lotn     1 Bottle    Use daily as directed    Acne vulgaris       tazarotene 0.05 % Crea cream    TAZORAC    30 g    User every other night at first as tolerated and then can advance to daily at night.     Acne       triamcinolone acetonide 10 MG/ML injection    KENALOG    5 mL    See med note    AA (alopecia areata)

## 2017-06-19 NOTE — NURSING NOTE
Drug Administration Record    Drug Name: triamcinolone acetonide(kenalog)  Dose: 3mL of triamcinolone 10mg/mL, 30mg dose  Route administered: intralesional  NDC #: Kenalog-10 ([address]-54)  Amount of waste(mL):2mL  Reason for waste: Single use vial

## 2017-06-19 NOTE — NURSING NOTE
Dermatology Rooming Note    Dariusz Marshall's goals for this visit include:   Chief Complaint   Patient presents with     Hair/Scalp Problem     Alopecia Areata. Dariusz notes improvement - re-growth.     Derm Problem     Acne. Dariusz notes that his acne is stable since his last visit.     Monica Edouard, CMA

## 2017-06-19 NOTE — PROGRESS NOTES
[facility] Dermatology Note      Dermatology Problem List:  1.Alopecia areata, persistent patchy    2. Acne vulgaris  -current treatment: sulfacetamide/sulfur wash daily, and Tazorac, prn to acneiform papules     3. History of hypovitaminosis D    Encounter Date: Jun 19, 2017    CC:  Chief Complaint   Patient presents with     Hair/Scalp Problem     Alopecia Areata. Dariusz notes improvement - re-growth.     Derm Problem     Acne. Dariusz notes that his acne is stable since his last visit.         History of Present Illness:  Mr. Dariusz Marshall is a 22 year old male who presents as follow-up for alopecia areata. The patient was last seen 3/31/2017 when he received 3 cc ILK 10mg/cc. Since last visit he decreased use of clobetasol 0.05% to 2-3x a week and increased use of ketoconazole 2% shampoos to 3-4x per week which improved his seborrhea.  Additionally he uses Rogaine 5% foam on scalp after every shower. He continues to take Vit D 2000 IU QD and biotin 10,000 mcg QD.     Since last visit he feels his hair growth has improved and denies any new  loss. The hair growth continues to be blond (versus his normal dark hair color). He feels he does not have excessive dryness or seborrhea. Denies scalp symptoms such as pain, burning, and pruritus.. He still has patchy loss on right lower chin.     Past Medical History:   Patient Active Problem List   Diagnosis     AA (alopecia areata)     Focal hyperhidrosis     Autoimmune disease (H)     Acne vulgaris     Vitamin D deficiency     No past medical history on file.  No past surgical history on file.    Social History:  The patient works at [employer]. 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    Family History:  There is no family history of hair loss.   Maternal grandmother with thyroid disease.     Medications:  Current Outpatient Prescriptions   Medication Sig Dispense Refill     Biotin (BIOTIN MAXIMUM STRENGTH) 10 MG TABS tablet  Take 10,000 mcg by mouth daily       Multiple Vitamins-Minerals (MULTIVITAMIN & MINERAL PO) Take 1 tablet by mouth daily       clobetasol propionate 0.05 % SHAM Apply to dry scalp, leave for ten minutes, then rinse out.  Alternate use with ketoconazole shampoo. 118 mL 3     sulfacetamide sodium-sulfur 10-5 % LOTN Use daily as directed 1 Bottle 11     ketoconazole (NIZORAL) 2 % shampoo Apply to scalp, lather, leave on for 5 minutes, then rinse. 120 mL 4     aluminum chloride (DRYSOL) 20 % external solution To improve effect, cover area of application with plastic wrap,  hold in place with tight shirt, and wash area in morning. As sweating improves, decrease use to 1-2 times weekly. 60 mL 3     Tazarotene (TAZORAC) 0.05 % CREA User every other night at first as tolerated and then can advance to daily at night. 30 g 11     Sulfacetamide-Sulfur-Cleanser (ROSADERM) 10-5 % KIT Externally apply 1 Applicatorful topically daily 1 kit 11     Minoxidil (ROGAINE MENS) 5 % FOAM Apply to scalp daily after showering 1 Can 11     clindamycin (CLEOCIN T) 1 % lotion Apply  topically daily. Apply to face in morning. 60 mL 12     No Known Allergies      Review of Systems:  -Constitutional: The patient denies fatigue, fevers, chills, unintended weight loss, and night sweats.  -HEENT: Patient denies nonhealing oral sores.  -Skin: As above in HPI. No additional skin concerns.    Physical exam:  Vitals: /80  Pulse 105  GEN: This is a well developed, well-nourished male in no acute distress, in a pleasant mood.    SKIN: Focused examination of the scalp was performed.  -2 cm blond hair growth on temporal, frontal, and parietal scalp   - no patchy loss on scalp  -patch of alopecia present on R lower chin  -No other lesions of concern on areas examined.     Impression/Plan:  1. Alopecia areata, patchy persistent subtype    Kenalog intralesional injection procedure note (performed by faculty with medical student assist): After verbal  consent and discussion of risks including but not limited to atrophy, pain, and bruising, cleansing with isopropyl alcohol, time out was performed, 3 total cc of Kenalog 10 mg/cc was injected into approximately 30 sites of thinning on the scalp.  The patient tolerated the procedure well and left the Dermatology clinic in good condition.    Continues with ketoconazole shampoo 2% shampoos to 3-4x per week     Continue clobetasol 0.05% to 2-3x a week     Continue Rogaine 5% foam on scalp and lower right chin    Stop Vitamin D, consider restarting this fall      2. Acne vulgaris, under excellent control. Continue current topical medis.    Follow-up 3 months.    Staff Involved:  Scribed by Venkat Gonzalez, MS3 for Dr. Madrid.      I agree with the PFSH and ROS as completed by the Medical Student. The remainder of the encounter was performed by me and scribed by the Medical Student. The scribed note accurately reflects my personal services and the medical decisions made by me. ILK injections were done together as noted above.      Nelly Madrid MD  Professor and Chair  Department of Dermatology  [facility]

## 2017-06-19 NOTE — LETTER
6/19/2017       RE: Dariusz Marshall  [address] [place] [address]  [place] [address]     Dear Colleague,    Thank you for referring your patient, Dariusz Marshall, to the [facility] DERMAT[facility]Y at [facility]. Please see a copy of my visit note below.    [facility] Dermatology Note      Dermatology Problem List:  1.Alopecia areata, persistent patchy    2. Acne vulgaris  -current treatment: sulfacetamide/sulfur wash daily, and Tazorac, prn to acneiform papules     3. History of hypovitaminosis D    Encounter Date: Jun 19, 2017    CC:  Chief Complaint   Patient presents with     Hair/Scalp Problem     Alopecia Areata. Dariusz notes improvement - re-growth.     Derm Problem     Acne. Dariusz notes that his acne is stable since his last visit.         History of Present Illness:  Mr. Dariusz Marshall is a 22 year old male who presents as follow-up for alopecia areata. The patient was last seen 3/31/2017 when he received 3 cc ILK 10mg/cc. Since last visit he decreased use of clobetasol 0.05% to 2-3x a week and increased use of ketoconazole 2% shampoos to 3-4x per week which improved his seborrhea.  Additionally he uses Rogaine 5% foam on scalp after every shower. He continues to take Vit D 2000 IU QD and biotin 10,000 mcg QD.     Since last visit he feels his hair growth has improved and denies any new  loss. The hair growth continues to be blond (versus his normal dark hair color). He feels he does not have excessive dryness or seborrhea. Denies scalp symptoms such as pain, burning, and pruritus.. He still has patchy loss on right lower chin.     Past Medical History:   Patient Active Problem List   Diagnosis     AA (alopecia areata)     Focal hyperhidrosis     Autoimmune disease (H)     Acne vulgaris     Vitamin D deficiency     No past medical history on file.  No past surgical history on file.    Social History:  The patient works at [employer]. 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    Family History:  There is no family history of hair loss.   Maternal grandmother with thyroid disease.     Medications:  Current Outpatient Prescriptions   Medication Sig Dispense Refill     Biotin (BIOTIN MAXIMUM STRENGTH) 10 MG TABS tablet Take 10,000 mcg by mouth daily       Multiple Vitamins-Minerals (MULTIVITAMIN & MINERAL PO) Take 1 tablet by mouth daily       clobetasol propionate 0.05 % SHAM Apply to dry scalp, leave for ten minutes, then rinse out.  Alternate use with ketoconazole shampoo. 118 mL 3     sulfacetamide sodium-sulfur 10-5 % LOTN Use daily as directed 1 Bottle 11     ketoconazole (NIZORAL) 2 % shampoo Apply to scalp, lather, leave on for 5 minutes, then rinse. 120 mL 4     aluminum chloride (DRYSOL) 20 % external solution To improve effect, cover area of application with plastic wrap,  hold in place with tight shirt, and wash area in morning. As sweating improves, decrease use to 1-2 times weekly. 60 mL 3     Tazarotene (TAZORAC) 0.05 % CREA User every other night at first as tolerated and then can advance to daily at night. 30 g 11     Sulfacetamide-Sulfur-Cleanser (ROSADERM) 10-5 % KIT Externally apply 1 Applicatorful topically daily 1 kit 11     Minoxidil (ROGAINE MENS) 5 % FOAM Apply to scalp daily after showering 1 Can 11     clindamycin (CLEOCIN T) 1 % lotion Apply  topically daily. Apply to face in morning. 60 mL 12     No Known Allergies      Review of Systems:  -Constitutional: The patient denies fatigue, fevers, chills, unintended weight loss, and night sweats.  -HEENT: Patient denies nonhealing oral sores.  -Skin: As above in HPI. No additional skin concerns.    Physical exam:  Vitals: /80  Pulse 105  GEN: This is a well developed, well-nourished male in no acute distress, in a pleasant mood.    SKIN: Focused examination of the scalp was performed.  -2 cm blond hair growth on temporal, frontal, and parietal scalp   - no patchy loss on scalp  -patch of  alopecia present on R lower chin  -No other lesions of concern on areas examined.     Impression/Plan:  1. Alopecia areata, patchy persistent subtype    Kenalog intralesional injection procedure note (performed by faculty with medical student assist): After verbal consent and discussion of risks including but not limited to atrophy, pain, and bruising, cleansing with isopropyl alcohol, time out was performed, 3 total cc of Kenalog 10 mg/cc was injected into approximately 30 sites of thinning on the scalp.  The patient tolerated the procedure well and left the Dermatology clinic in good condition.    Continues with ketoconazole shampoo 2% shampoos to 3-4x per week     Continue clobetasol 0.05% to 2-3x a week     Continue Rogaine 5% foam on scalp and lower right chin    Stop Vitamin D, consider restarting this fall      2. Acne vulgaris, under excellent control. Continue current topical medis.    Follow-up 3 months.    Staff Involved:  Scribed by Venkat Gonzalez MS3 for Dr. Madrid.      I agree with the PFSH and ROS as completed by the Medical Student. The remainder of the encounter was performed by me and scribed by the Medical Student. The scribed note accurately reflects my personal services and the medical decisions made by me. ILK injections were done together as noted above.      Nelly Madrid MD  Professor and Chair  Department of Dermatology  [facility]    Pictures were placed in Pt's chart today for future reference.            Again, thank you for allowing me to participate in the care of your patient.      Sincerely,    Nelly Madrid MD

## 2017-10-24 DIAGNOSIS — L63.9 AA (ALOPECIA AREATA): ICD-10-CM

## 2017-10-24 RX ORDER — KETOCONAZOLE 20 MG/ML
SHAMPOO TOPICAL
Qty: 120 ML | Refills: 9 | Status: SHIPPED | OUTPATIENT
Start: 2017-10-24 | End: 2023-05-02

## 2017-10-24 NOTE — TELEPHONE ENCOUNTER
As RN following Medication Refill Protocol, refill request received  for Ketoconazole 2% champoo. Dr. Madrid's notes reviewed. Refill appropriate, and accepted.    Hazel Smith RN   Dermatology

## 2017-10-24 NOTE — TELEPHONE ENCOUNTER
Last seen 6/19/17: Dariusz is on the recall list for a future appointment.  1. Alopecia areata, patchy persistent subtype    Kenalog intralesional injection procedure note (performed by faculty with medical student assist): After verbal consent and discussion of risks including but not limited to atrophy, pain, and bruising, cleansing with isopropyl alcohol, time out was performed, 3 total cc of Kenalog 10 mg/cc was injected into approximately 30 sites of thinning on the scalp.  The patient tolerated the procedure well and left the Dermatology clinic in good condition.    Continues with ketoconazole shampoo 2% shampoos to 3-4x per week     Continue clobetasol 0.05% to 2-3x a week     Continue Rogaine 5% foam on scalp and lower right chin    Stop Vitamin D, consider restarting this fall        2. Acne vulgaris, under excellent control. Continue current topical medis.     Follow-up 3 months.

## 2017-11-08 DIAGNOSIS — L74.519 FOCAL HYPERHIDROSIS: ICD-10-CM

## 2017-11-08 NOTE — TELEPHONE ENCOUNTER
Received refill request for drysol.  This rx has not been requested for many years, but patient follows with dermatology for other issues.  Will refill.  Concerns should be addressed at follow up visits.    Karie Scott MD  PGY-4, Dermatology

## 2017-12-22 DIAGNOSIS — L63.9 AA (ALOPECIA AREATA): ICD-10-CM

## 2017-12-22 NOTE — TELEPHONE ENCOUNTER
1. Last seen 6/19/17: Next appt: 12/29/17        Alopecia areata, patchy persistent subtype    Kenalog intralesional injection procedure note (performed by faculty with medical student assist): After verbal consent and discussion of risks including but not limited to atrophy, pain, and bruising, cleansing with isopropyl alcohol, time out was performed, 3 total cc of Kenalog 10 mg/cc was injected into approximately 30 sites of thinning on the scalp.  The patient tolerated the procedure well and left the Dermatology clinic in good condition.    Continues with ketoconazole shampoo 2% shampoos to 3-4x per week     Continue clobetasol 0.05% to 2-3x a week     Continue Rogaine 5% foam on scalp and lower right chin    Stop Vitamin D, consider restarting this fall          2. Acne vulgaris, under excellent control. Continue current topical medis.      Follow-up 3 months.

## 2017-12-26 RX ORDER — CLOBETASOL PROPIONATE 0.05 G/100ML
SHAMPOO TOPICAL
Qty: 118 ML | Refills: 3 | OUTPATIENT
Start: 2017-12-26

## 2017-12-26 NOTE — TELEPHONE ENCOUNTER
Refill request received  for clobetasol 0.05% shampoo. Dr. Madrid's notes reviewed. Patient has upcoming appt in 3 days. Medication can be re-evaluated at that time. Writer unable to refill as it has been more than 3 months since he has been seen..    Alexa Romero RN

## 2019-01-17 DIAGNOSIS — L63.9 AA (ALOPECIA AREATA): ICD-10-CM

## 2019-01-18 RX ORDER — KETOCONAZOLE 20 MG/ML
SHAMPOO TOPICAL
Qty: 120 ML | Refills: 9 | OUTPATIENT
Start: 2019-01-18

## 2019-11-06 ENCOUNTER — HEALTH MAINTENANCE LETTER (OUTPATIENT)
Age: 24
End: 2019-11-06

## 2020-11-29 ENCOUNTER — HEALTH MAINTENANCE LETTER (OUTPATIENT)
Age: 25
End: 2020-11-29

## 2021-07-23 ENCOUNTER — TRANSCRIBE ORDERS (OUTPATIENT)
Dept: OTHER | Age: 26
End: 2021-07-23

## 2021-07-23 DIAGNOSIS — L63.9 ALOPECIA AREATA: Primary | ICD-10-CM

## 2021-09-19 ENCOUNTER — HEALTH MAINTENANCE LETTER (OUTPATIENT)
Age: 26
End: 2021-09-19

## 2021-12-16 NOTE — TELEPHONE ENCOUNTER
FUTURE VISIT INFORMATION      FUTURE VISIT INFORMATION:    Date: 12.28.21    Time: 2:30    Location: [facility]  REFERRAL INFORMATION:    Referring provider:  Agnieszka Mars PA-C    Referring providers clinic:  [facility]    Reason for visit/diagnosis   previous pt of Dr Madrid back in 2017, new referral to resume treatments/ appt sched per pt    RECORDS REQUESTED FROM:       Clinic name Comments Records Status Imaging Status   [facility] Derm 10.6.21  Agnieszka Mars CE na   [facility]erm 6.19.17 + more encounters with  Dr. Madrid Epic Epic

## 2021-12-28 ENCOUNTER — LAB (OUTPATIENT)
Dept: LAB | Facility: CLINIC | Age: 26
End: 2021-12-28

## 2021-12-28 ENCOUNTER — PRE VISIT (OUTPATIENT)
Dept: DERMATOLOGY | Facility: CLINIC | Age: 26
End: 2021-12-28

## 2021-12-28 ENCOUNTER — OFFICE VISIT (OUTPATIENT)
Dept: DERMATOLOGY | Facility: CLINIC | Age: 26
End: 2021-12-28
Payer: COMMERCIAL

## 2021-12-28 VITALS — DIASTOLIC BLOOD PRESSURE: 74 MMHG | SYSTOLIC BLOOD PRESSURE: 118 MMHG | HEART RATE: 76 BPM

## 2021-12-28 DIAGNOSIS — L63.9 AA (ALOPECIA AREATA): Primary | ICD-10-CM

## 2021-12-28 DIAGNOSIS — M35.9 AUTOIMMUNE DISEASE (H): ICD-10-CM

## 2021-12-28 DIAGNOSIS — L63.9 AA (ALOPECIA AREATA): ICD-10-CM

## 2021-12-28 DIAGNOSIS — E55.9 VITAMIN D DEFICIENCY: ICD-10-CM

## 2021-12-28 DIAGNOSIS — Z79.622 LONG-TERM CURRENT USE OF TOFACITINIB: ICD-10-CM

## 2021-12-28 LAB
ALBUMIN SERPL-MCNC: 3.8 G/DL (ref 3.4–5)
ALBUMIN UR-MCNC: NEGATIVE MG/DL
ALP SERPL-CCNC: 52 U/L (ref 40–150)
ALT SERPL W P-5'-P-CCNC: 37 U/L (ref 0–70)
ANION GAP SERPL CALCULATED.3IONS-SCNC: 5 MMOL/L (ref 3–14)
APPEARANCE UR: CLEAR
AST SERPL W P-5'-P-CCNC: 11 U/L (ref 0–45)
BASOPHILS # BLD AUTO: 0 10E3/UL (ref 0–0.2)
BASOPHILS NFR BLD AUTO: 1 %
BILIRUB SERPL-MCNC: 0.5 MG/DL (ref 0.2–1.3)
BILIRUB UR QL STRIP: NEGATIVE
BUN SERPL-MCNC: 8 MG/DL (ref 7–30)
CALCIUM SERPL-MCNC: 8.6 MG/DL (ref 8.5–10.1)
CHLORIDE BLD-SCNC: 106 MMOL/L (ref 94–109)
CHOLEST SERPL-MCNC: 173 MG/DL
CO2 SERPL-SCNC: 31 MMOL/L (ref 20–32)
COLOR UR AUTO: YELLOW
CREAT SERPL-MCNC: 0.87 MG/DL (ref 0.66–1.25)
DEPRECATED CALCIDIOL+CALCIFEROL SERPL-MC: 17 UG/L (ref 20–75)
EOSINOPHIL # BLD AUTO: 0.2 10E3/UL (ref 0–0.7)
EOSINOPHIL NFR BLD AUTO: 4 %
ERYTHROCYTE [DISTWIDTH] IN BLOOD BY AUTOMATED COUNT: 11.8 % (ref 10–15)
FASTING STATUS PATIENT QL REPORTED: NO
FERRITIN SERPL-MCNC: 85 NG/ML (ref 26–388)
GFR SERPL CREATININE-BSD FRML MDRD: >90 ML/MIN/1.73M2
GLUCOSE BLD-MCNC: 92 MG/DL (ref 70–99)
GLUCOSE UR STRIP-MCNC: NEGATIVE MG/DL
HBV CORE AB SERPL QL IA: NONREACTIVE
HBV SURFACE AB SERPL IA-ACNC: 0.04 M[IU]/ML
HBV SURFACE AG SERPL QL IA: NONREACTIVE
HCT VFR BLD AUTO: 44.9 % (ref 40–53)
HCV AB SERPL QL IA: NONREACTIVE
HDLC SERPL-MCNC: 50 MG/DL
HGB BLD-MCNC: 15.6 G/DL (ref 13.3–17.7)
HGB UR QL STRIP: NEGATIVE
HIV 1+2 AB+HIV1 P24 AG SERPL QL IA: NONREACTIVE
IMM GRANULOCYTES # BLD: 0 10E3/UL
IMM GRANULOCYTES NFR BLD: 0 %
IRON SATN MFR SERPL: 12 % (ref 15–46)
IRON SERPL-MCNC: 37 UG/DL (ref 35–180)
KETONES UR STRIP-MCNC: NEGATIVE MG/DL
LDLC SERPL CALC-MCNC: 103 MG/DL
LEUKOCYTE ESTERASE UR QL STRIP: NEGATIVE
LYMPHOCYTES # BLD AUTO: 0.9 10E3/UL (ref 0.8–5.3)
LYMPHOCYTES NFR BLD AUTO: 16 %
MCH RBC QN AUTO: 31.3 PG (ref 26.5–33)
MCHC RBC AUTO-ENTMCNC: 34.7 G/DL (ref 31.5–36.5)
MCV RBC AUTO: 90 FL (ref 78–100)
MONOCYTES # BLD AUTO: 0.5 10E3/UL (ref 0–1.3)
MONOCYTES NFR BLD AUTO: 9 %
MUCOUS THREADS #/AREA URNS LPF: PRESENT /LPF
NEUTROPHILS # BLD AUTO: 3.9 10E3/UL (ref 1.6–8.3)
NEUTROPHILS NFR BLD AUTO: 70 %
NITRATE UR QL: NEGATIVE
NONHDLC SERPL-MCNC: 123 MG/DL
NRBC # BLD AUTO: 0 10E3/UL
NRBC BLD AUTO-RTO: 0 /100
PH UR STRIP: 6 [PH] (ref 5–7)
PLATELET # BLD AUTO: 193 10E3/UL (ref 150–450)
POTASSIUM BLD-SCNC: 4.4 MMOL/L (ref 3.4–5.3)
PROT SERPL-MCNC: 7.1 G/DL (ref 6.8–8.8)
RBC # BLD AUTO: 4.99 10E6/UL (ref 4.4–5.9)
RBC URINE: <1 /HPF
SODIUM SERPL-SCNC: 142 MMOL/L (ref 133–144)
SP GR UR STRIP: 1.03 (ref 1–1.03)
TIBC SERPL-MCNC: 313 UG/DL (ref 240–430)
TRIGL SERPL-MCNC: 99 MG/DL
TSH SERPL DL<=0.005 MIU/L-ACNC: 0.78 MU/L (ref 0.4–4)
UROBILINOGEN UR STRIP-MCNC: 4 MG/DL
WBC # BLD AUTO: 5.6 10E3/UL (ref 4–11)
WBC URINE: 1 /HPF

## 2021-12-28 PROCEDURE — 86706 HEP B SURFACE ANTIBODY: CPT | Mod: 90 | Performed by: PATHOLOGY

## 2021-12-28 PROCEDURE — 86803 HEPATITIS C AB TEST: CPT | Mod: 90 | Performed by: PATHOLOGY

## 2021-12-28 PROCEDURE — 82306 VITAMIN D 25 HYDROXY: CPT | Mod: 90 | Performed by: PATHOLOGY

## 2021-12-28 PROCEDURE — 99000 SPECIMEN HANDLING OFFICE-LAB: CPT | Performed by: PATHOLOGY

## 2021-12-28 PROCEDURE — 84630 ASSAY OF ZINC: CPT | Mod: 90 | Performed by: PATHOLOGY

## 2021-12-28 PROCEDURE — 82728 ASSAY OF FERRITIN: CPT | Performed by: PATHOLOGY

## 2021-12-28 PROCEDURE — 83550 IRON BINDING TEST: CPT | Performed by: PATHOLOGY

## 2021-12-28 PROCEDURE — 80050 GENERAL HEALTH PANEL: CPT | Performed by: PATHOLOGY

## 2021-12-28 PROCEDURE — 86704 HEP B CORE ANTIBODY TOTAL: CPT | Mod: 90 | Performed by: PATHOLOGY

## 2021-12-28 PROCEDURE — 86481 TB AG RESPONSE T-CELL SUSP: CPT | Mod: 90 | Performed by: PATHOLOGY

## 2021-12-28 PROCEDURE — 99204 OFFICE O/P NEW MOD 45 MIN: CPT | Mod: GC | Performed by: DERMATOLOGY

## 2021-12-28 PROCEDURE — 87340 HEPATITIS B SURFACE AG IA: CPT | Mod: 90 | Performed by: PATHOLOGY

## 2021-12-28 PROCEDURE — 82785 ASSAY OF IGE: CPT | Mod: 90 | Performed by: PATHOLOGY

## 2021-12-28 PROCEDURE — 81001 URINALYSIS AUTO W/SCOPE: CPT | Performed by: PATHOLOGY

## 2021-12-28 PROCEDURE — 36415 COLL VENOUS BLD VENIPUNCTURE: CPT | Performed by: PATHOLOGY

## 2021-12-28 PROCEDURE — 80061 LIPID PANEL: CPT | Performed by: PATHOLOGY

## 2021-12-28 PROCEDURE — 87389 HIV-1 AG W/HIV-1&-2 AB AG IA: CPT | Mod: 90 | Performed by: PATHOLOGY

## 2021-12-28 RX ORDER — CLOBETASOL PROPIONATE 0.5 MG/ML
SOLUTION TOPICAL
COMMUNITY
Start: 2021-10-06 | End: 2022-10-10

## 2021-12-28 ASSESSMENT — PAIN SCALES - GENERAL: PAINLEVEL: NO PAIN (0)

## 2021-12-28 NOTE — LETTER
12/28/2021       RE: Dariusz Marshall  [address]  [facility] [address]     Dear Colleague,    Thank you for referring your patient, Dariusz Marshall, to the [facility] DERMATOLOGY CLINIC [place] at [facility]. Please see a copy of my visit note below.    [facility] Dermatology Note  Encounter Date: Dec 28, 2021  Office Visit     Dermatology Problem List:  #.  Alopecia areata  - Plan to initiate Xeljanz  - Current tx: clobetasol solution, ketoconazole shampoo, and intermittent ILK  - Labs: Hair labs and Xeljanz safety labs pending  #. History of hypovitaminosis D  ____________________________________________    Assessment & Plan:   # Non-scarring alopecia secondary to alopecia areata  Patient has had a waxing and waning course necessitating ongoing use of topical corticosteroids and intermittent ILK injections for treatment of his AA. He desires alternative treatment options today to see if hair regrowth can be sustained. Discussed with patient tofacitinib as an option. Discussed benefits and risks of medication including but not limited to risk of serious infection, lymphoma/leukemia, other malignancies, abnormalities in CBC, liver dysfunction, Zoster reactivation, URI and renal disease. The patient was counseled to hold dose if he/she feels ill and to contact clinic. The patient denies conduction abnormalities, syncope or arrhythmia, ischemic heart disease, heart failure, and is not receiving concomitant therapy known to decrease heart rate or prolong the IA interval, no history of clotting.There is no history of GI perforation, diverticulitis or interstitial lung disease. We reviewed there may be no response, some response or robust response that is not predictable at this time. We reviewed the FDA warning on increased dosing, issues with medications in those >50 years with one cardiac risk factor and those with Ulcerative colitis. The patient  will stop this drug for signs of clot: SOB, difficulty breathing, chest pain, welling or arm or leg, leg pain and seek urgent care. The patient is not taking NSAIDs, corticosteroids regularly orally or methotrexate with this medication as this may increase risk of diverticulitis.     Pending normal labs, will start tofacinitib 5mg twice a day.  For tofacitinib monitoring, will obtain baseline CBC with differential, CMP, fasting lipid profile, quantiferon TB gold, HIV/Hep B core IgM/Hep B core IgG/Hep B sAG/Hep C Ab, and HR/BP.     We will not initiate therapy if abs lymphocyte count <500 cells/mm3, absolute neutrophil count <1000 cells/mm3, hemoglobin <9 g/dL, baseline heart rate <60 bpm.For lab monitoring, will repeat CBC with diff, CMP, fasting lipid profile in 4 weeks, 8 weeks, and if stable then every 3 months thereafter.     Lab results are wnl in large part - only zinc and iron saturation are slightly below the normal range; vitamin D level though is below the normal range - recommend increasing dietary intake of zinc and iron, vitamin D 2000 international unit(s) every day.     Skin exam for skin cancer will also be periodically performed.  Will need to review with patient at each visit symptoms of nasopharyngitis, myalgias, cardiovascular effects,  abdominal symptoms including diarrhea, nausea, headache, parasthesias, recent hospitalizations/illnesses, new or changing moles. Acne and weight gain have also been reported. HR/BP will be obtained at each clinic visit.     Procedures Performed:   None    Follow-up: 1 month virtual after Xeljanz initiated.    Staff and Resident:     Shorty Andrea MD  PGY-2 Dermatology  Pager: [phone]    Patient was seen and examined with the dermatology resident. I agree with the history, review of systems, physical examination, assessments and plan.    Provider Disclosure:   The documentation recorded by the scribe accurately reflects the services I personally performed and the  decisions made by me.    Nelly Madrid MD  Professor and  Chair  Department of Dermatology  [facility]      ____________________________________________    CC: Hair Loss (Dariusz is here today for a hair loss follow up - wondering if there is anything new that he can try since his last visit. )      HPI:  Mr. Dariusz Marshall is a 26 year old male who presents as a new patient as though he has been seen here previously more than 3 years have passed since his last visit. He is here today  for follow-up of hair loss, diagnosed as alopecia areata.      - Patient states he recently had an episode of hair loss approximately 6 months ago and is currently in regrowth phase. Current treatment is clobetasol solution 1 week on 1 week off, Rogaine every day (1 bottle lasts 2-3 weeks), ketoconazole shampoo every other day. Has noticed some eyebrow hair loss and eyelash hair loss which is newer for him.  He has used latisse in the past but noted irritation, has not used it for his eyebrows before. He last had ILK in October, thinks it helped somewhat. Is interested in different treatments today that will hopefully sustain his hair regrowth without recurrence of episodes. + hx of MBP. Denies hx of eczema, or seasonal allergies. Dariusz notes a significant bother factor with his alopecia areata.   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    Patient is otherwise feeling well, in usual state of health, and has no additional skin concerns today.     ROS: As per HPI    Labs:  None reviewed.    Physical Exam:  Vitals: /74   Pulse 76   GEN: Well developed, well-nourished, in no acute distress, in a pleasant mood.    SKIN: Focused examination of scalp, face and nails was performed.  - Patchy areas of decreased hair density with associated regrowth and poliosis, negative hair pull test  - Eyebrows with patches of hair loss but with vellus hair regrowth noted  - Mild decrease in hair on the  eyelashes  - No nail pitting  - Frontotemporal hair line recession  - No other lesions of concern on areas examined.                             Medications:  Current Outpatient Medications   Medication     Biotin (BIOTIN MAXIMUM STRENGTH) 10 MG TABS tablet     clindamycin (CLEOCIN T) 1 % lotion     clobetasol (TEMOVATE) 0.05 % external solution     ketoconazole (NIZORAL) 2 % shampoo     Minoxidil (ROGAINE MENS) 5 % FOAM     aluminum chloride (DRYSOL) 20 % external solution     clobetasol propionate 0.05 % SHAM     Multiple Vitamins-Minerals (MULTIVITAMIN & MINERAL PO)     sulfacetamide sodium-sulfur 10-5 % LOTN     Sulfacetamide-Sulfur-Cleanser (ROSADERM) 10-5 % KIT     Tazarotene (TAZORAC) 0.05 % CREA     No current facility-administered medications for this visit.      Past Medical History:   Patient Active Problem List   Diagnosis     AA (alopecia areata)     Focal hyperhidrosis     Autoimmune disease (H)     Acne vulgaris     Vitamin D deficiency     History reviewed. No pertinent past medical history.    CC Referred MD Dipesh  No address on file on close of this encounter.      Again, thank you for allowing me to participate in the care of your patient.      Sincerely,     Nelly Madrid MD

## 2021-12-28 NOTE — PROGRESS NOTES
[facility] Dermatology Note  Encounter Date: Dec 28, 2021  Office Visit     Dermatology Problem List:  #.  Alopecia areata  - Plan to initiate Xeljanz  - Current tx: clobetasol solution, ketoconazole shampoo, and intermittent ILK  - Labs: Hair labs and Xeljanz safety labs pending  #. History of hypovitaminosis D  ____________________________________________    Assessment & Plan:   # Non-scarring alopecia secondary to alopecia areata  Patient has had a waxing and waning course necessitating ongoing use of topical corticosteroids and intermittent ILK injections for treatment of his AA. He desires alternative treatment options today to see if hair regrowth can be sustained. Discussed with patient tofacitinib as an option. Discussed benefits and risks of medication including but not limited to risk of serious infection, lymphoma/leukemia, other malignancies, abnormalities in CBC, liver dysfunction, Zoster reactivation, URI and renal disease. The patient was counseled to hold dose if he/she feels ill and to contact clinic. The patient denies conduction abnormalities, syncope or arrhythmia, ischemic heart disease, heart failure, and is not receiving concomitant therapy known to decrease heart rate or prolong the FL interval, no history of clotting.There is no history of GI perforation, diverticulitis or interstitial lung disease. We reviewed there may be no response, some response or robust response that is not predictable at this time. We reviewed the FDA warning on increased dosing, issues with medications in those >50 years with one cardiac risk factor and those with Ulcerative colitis. The patient will stop this drug for signs of clot: SOB, difficulty breathing, chest pain, welling or arm or leg, leg pain and seek urgent care. The patient is not taking NSAIDs, corticosteroids regularly orally or methotrexate with this medication as this may increase risk of diverticulitis.     Pending normal labs,  will start tofacinitib 5mg twice a day.  For tofacitinib monitoring, will obtain baseline CBC with differential, CMP, fasting lipid profile, quantiferon TB gold, HIV/Hep B core IgM/Hep B core IgG/Hep B sAG/Hep C Ab, and HR/BP.     We will not initiate therapy if abs lymphocyte count <500 cells/mm3, absolute neutrophil count <1000 cells/mm3, hemoglobin <9 g/dL, baseline heart rate <60 bpm.For lab monitoring, will repeat CBC with diff, CMP, fasting lipid profile in 4 weeks, 8 weeks, and if stable then every 3 months thereafter.     Lab results are wnl in large part - only zinc and iron saturation are slightly below the normal range; vitamin D level though is below the normal range - recommend increasing dietary intake of zinc and iron, vitamin D 2000 international unit(s) every day.     Skin exam for skin cancer will also be periodically performed.  Will need to review with patient at each visit symptoms of nasopharyngitis, myalgias, cardiovascular effects,  abdominal symptoms including diarrhea, nausea, headache, parasthesias, recent hospitalizations/illnesses, new or changing moles. Acne and weight gain have also been reported. HR/BP will be obtained at each clinic visit.     Procedures Performed:   None    Follow-up: 1 month virtual after Xeljanz initiated.    Staff and Resident:     Shorty Andrea MD  PGY-2 Dermatology  Pager: [phone]    Patient was seen and examined with the dermatology resident. I agree with the history, review of systems, physical examination, assessments and plan.    Provider Disclosure:   The documentation recorded by the scribe accurately reflects the services I personally performed and the decisions made by me.    Nelly Madrid MD  Professor and  Chair  Department of Dermatology  [facility]      ____________________________________________    CC: Hair Loss (Dariusz is here today for a hair loss follow up - wondering if there is anything new that he can try since his last visit.  )      HPI:  Mr. Dariusz Marshall is a 26 year old male who presents as a new patient as though he has been seen here previously more than 3 years have passed since his last visit. He is here today  for follow-up of hair loss, diagnosed as alopecia areata.      - Patient states he recently had an episode of hair loss approximately 6 months ago and is currently in regrowth phase. Current treatment is clobetasol solution 1 week on 1 week off, Rogaine every day (1 bottle lasts 2-3 weeks), ketoconazole shampoo every other day. Has noticed some eyebrow hair loss and eyelash hair loss which is newer for him.  He has used latisse in the past but noted irritation, has not used it for his eyebrows before. He last had ILK in October, thinks it helped somewhat. Is interested in different treatments today that will hopefully sustain his hair regrowth without recurrence of episodes. + hx of MBP. Denies hx of eczema, or seasonal allergies. Dariusz notes a significant bother factor with his alopecia areata.   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    Patient is otherwise feeling well, in usual state of health, and has no additional skin concerns today.     ROS: As per HPI    Labs:  None reviewed.    Physical Exam:  Vitals: /74   Pulse 76   GEN: Well developed, well-nourished, in no acute distress, in a pleasant mood.    SKIN: Focused examination of scalp, face and nails was performed.  - Patchy areas of decreased hair density with associated regrowth and poliosis, negative hair pull test  - Eyebrows with patches of hair loss but with vellus hair regrowth noted  - Mild decrease in hair on the eyelashes  - No nail pitting  - Frontotemporal hair line recession  - No other lesions of concern on areas examined.                             Medications:  Current Outpatient Medications   Medication     Biotin (BIOTIN MAXIMUM STRENGTH) 10 MG TABS tablet     clindamycin (CLEOCIN T) 1 % lotion     clobetasol  (TEMOVATE) 0.05 % external solution     ketoconazole (NIZORAL) 2 % shampoo     Minoxidil (ROGAINE MENS) 5 % FOAM     aluminum chloride (DRYSOL) 20 % external solution     clobetasol propionate 0.05 % SHAM     Multiple Vitamins-Minerals (MULTIVITAMIN & MINERAL PO)     sulfacetamide sodium-sulfur 10-5 % LOTN     Sulfacetamide-Sulfur-Cleanser (ROSADERM) 10-5 % KIT     Tazarotene (TAZORAC) 0.05 % CREA     No current facility-administered medications for this visit.      Past Medical History:   Patient Active Problem List   Diagnosis     AA (alopecia areata)     Focal hyperhidrosis     Autoimmune disease (H)     Acne vulgaris     Vitamin D deficiency     History reviewed. No pertinent past medical history.    CC Referred Self, MD  No address on file on close of this encounter.

## 2021-12-28 NOTE — NURSING NOTE
Dermatology Rooming Note    Dariusz Marshall's goals for this visit include:   Chief Complaint   Patient presents with     Hair Loss     Dariusz is here today for a hair loss follow up - wondering if there is anything new that he can try since his last visit.      HERVE Peralta

## 2021-12-29 LAB
GAMMA INTERFERON BACKGROUND BLD IA-ACNC: 0.46 IU/ML
IGE SERPL-ACNC: 41 KU/L (ref 0–114)
M TB IFN-G BLD-IMP: NEGATIVE
M TB IFN-G CD4+ BCKGRND COR BLD-ACNC: 9.54 IU/ML
MITOGEN IGNF BCKGRD COR BLD-ACNC: -0.01 IU/ML
MITOGEN IGNF BCKGRD COR BLD-ACNC: -0.07 IU/ML
QUANTIFERON MITOGEN: 10 IU/ML
QUANTIFERON NIL TUBE: 0.46 IU/ML
QUANTIFERON TB1 TUBE: 0.45 IU/ML
QUANTIFERON TB2 TUBE: 0.39

## 2021-12-30 LAB — ZINC SERPL-MCNC: 57.5 UG/DL

## 2022-01-05 ENCOUNTER — TELEPHONE (OUTPATIENT)
Dept: DERMATOLOGY | Facility: CLINIC | Age: 27
End: 2022-01-05

## 2022-01-05 NOTE — TELEPHONE ENCOUNTER
PA Initiation    Medication: Xeljanz  Insurance Company: MED[employer] - Phone [phone] Fax [phone]  ID#: [id number]  Pharmacy Filling the Rx: [facility] [address]  Filling Pharmacy Phone:    Filling Pharmacy Fax:    Start Date: 1/5/2022    Faxed PA form to [employer]

## 2022-01-09 ENCOUNTER — HEALTH MAINTENANCE LETTER (OUTPATIENT)
Age: 27
End: 2022-01-09

## 2022-01-10 NOTE — TELEPHONE ENCOUNTER
PRIOR AUTHORIZATION DENIED    Medication: Xeljanz    Denial Date: 1/5/2022    Denial Rational:  Diagnosis is not covered    Appeal Information:

## 2022-01-13 NOTE — TELEPHONE ENCOUNTER
Medication Appeal Initiation    We have initiated an appeal for the requested medication:  Medication: Xeljanz  Appeal Start Date:  1/13/2022  Insurance Company: [employer] - [employer] [phone] Fax [phone]  Comments:

## 2022-01-24 NOTE — TELEPHONE ENCOUNTER
MEDICATION APPEAL DENIED    Medication: Xeljanz    Denial Date: 1/24/2022    Denial Rational: Diagnosis is not covered    Second Level Appeal Information: [phone]

## 2022-01-24 NOTE — TELEPHONE ENCOUNTER
2nd Medication Appeal Initiation    We have initiated an appeal for the requested medication:  Medication: Xeljanz  Appeal Start Date:  1/13/2022  Insurance Company: [employer] - [employer] [phone] Fax [phone]  Comments:

## 2022-02-15 ENCOUNTER — VIRTUAL VISIT (OUTPATIENT)
Dept: DERMATOLOGY | Facility: CLINIC | Age: 27
End: 2022-02-15
Payer: COMMERCIAL

## 2022-02-15 ENCOUNTER — MYC MEDICAL ADVICE (OUTPATIENT)
Dept: DERMATOLOGY | Facility: CLINIC | Age: 27
End: 2022-02-15

## 2022-02-15 DIAGNOSIS — L63.9 AA (ALOPECIA AREATA): Primary | ICD-10-CM

## 2022-02-15 DIAGNOSIS — Z79.622 LONG-TERM CURRENT USE OF TOFACITINIB: ICD-10-CM

## 2022-02-15 PROCEDURE — 99214 OFFICE O/P EST MOD 30 MIN: CPT | Mod: GC | Performed by: DERMATOLOGY

## 2022-02-15 RX ORDER — EMTRICITABINE AND TENOFOVIR DISOPROXIL FUMARATE 200; 300 MG/1; MG/1
1 TABLET, FILM COATED ORAL DAILY
COMMUNITY
End: 2023-09-16

## 2022-02-15 ASSESSMENT — PAIN SCALES - GENERAL: PAINLEVEL: NO PAIN (0)

## 2022-02-15 NOTE — LETTER
"2/15/2022       RE: Dariusz Marshall  [address]  [facility] [address]     Dear Colleague,    Thank you for referring your patient, Dariusz Marshall, to the [facility] DERMATOLOGY CLINIC [place] at [facility]. Please see a copy of my visit note below.    [facility] Dermatology Note  Encounter Date: Feb 15, 2022  Store-and-Forward and Telephone ([phone]). Location of teledermatologist: [facility] DERMATOLOGY CLINIC [place].  Start time: 450. End time: 500.    Dermatology Problem List:  #.  Alopecia areata  - Plan to initiate Xeljanz  - Current tx: clobetasol solution, ketoconazole shampoo, and intermittent ILK  - Labs: Hair labs and Xeljanz safety labs pending  #. History of hypovitaminosis D    ____________________________________________    Assessment & Plan:     # Non-scarring alopecia secondary to alopecia areata  - still awaiting approval process for Xeljanz, otherwise stable.  We discussed again the risks and benefits of Xeljanz and will check in 5/31 after he has had some time for the medication to start working.  We will also plan for labs at that time.      Procedures Performed:    None    Follow-up: Has appt scheduled for 5/31/22    Staff and Resident:     Marilyn Vela MD      The patient was seen and staffed with Dr. Mercy MD     ____________________________________________    CC: Hair Loss (Dariusz is having a virtual visit today for hair loss. He states \" it is getting a little better\")    HPI:  Mr. Dariusz Marshall is a(n) 26 year old male who presents today as a return patient for alopecia areata.  No new updates, he is continuing to work through approval process for xeljanz.  Has filled out his paperwork for free drug.  Hair/scalp is doing modestly improved.  Has mild regrowth in the posterior scalp.  No new areas of hair loss.  Only change is that he started taking PREP since last visit.      Patient is otherwise " feeling well, without additional skin concerns.    Labs Reviewed:  N/A    Physical Exam:  No photos provided.     Medications:  Current Outpatient Medications   Medication     aluminum chloride (DRYSOL) 20 % external solution     Biotin (BIOTIN MAXIMUM STRENGTH) 10 MG TABS tablet     clindamycin (CLEOCIN T) 1 % lotion     clobetasol (TEMOVATE) 0.05 % external solution     clobetasol propionate 0.05 % SHAM     ketoconazole (NIZORAL) 2 % shampoo     Minoxidil (ROGAINE MENS) 5 % FOAM     Multiple Vitamins-Minerals (MULTIVITAMIN & MINERAL PO)     sulfacetamide sodium-sulfur 10-5 % LOTN     Sulfacetamide-Sulfur-Cleanser (ROSADERM) 10-5 % KIT     Tazarotene (TAZORAC) 0.05 % CREA     tofacitinib (XELJANZ) 5 MG tablet     No current facility-administered medications for this visit.      Past Medical/Surgical History:   Patient Active Problem List   Diagnosis     AA (alopecia areata)     Focal hyperhidrosis     Autoimmune disease (H)     Acne vulgaris     Vitamin D deficiency     No past medical history on file.    Again, thank you for allowing me to participate in the care of your patient.      Sincerely,    Nelly Madrid MD    CC Justin Guerra MD  [facility]  [address] [place] [id number]

## 2022-02-15 NOTE — NURSING NOTE
"Dermatology Rooming Note    Dariusz Marshall's goals for this visit include:   Chief Complaint   Patient presents with     Hair Loss     Dariusz is having a virtual visit today for hair loss. He states \" it is getting a little better\"     Nelly Springer, HERVE  "

## 2022-02-15 NOTE — PROGRESS NOTES
"[facility] Dermatology Note  Encounter Date: Feb 15, 2022  Store-and-Forward and Telephone ([phone]). Location of teledermatologist: [facility] DERMATOLOGY CLINIC [place].  Start time: 450. End time: 500.    Dermatology Problem List:  #.  Alopecia areata  - Plan to initiate Xeljanz  - Current tx: clobetasol solution, ketoconazole shampoo, and intermittent ILK  - Labs: Hair labs and Xeljanz safety labs pending  #. History of hypovitaminosis D    ____________________________________________    Assessment & Plan:     # Non-scarring alopecia secondary to alopecia areata  - still awaiting approval process for Xeljanz, otherwise stable.  We discussed again the risks and benefits of Xeljanz and will check in 5/31 after he has had some time for the medication to start working.  We will also plan for labs at that time.      Procedures Performed:    None    Follow-up: Has appt scheduled for 5/31/22    Staff and Resident:     Marilyn Vela MD      The patient was seen and staffed with Dr. Mercy MD     Provider Disclosure:   The documentation recorded by the scribe accurately reflects the services I personally performed and the decisions made by me.    Patient was seen and examined with the dermatology resident. I agree with the history, review of systems, physical examination, assessments and plan.    Nelly Madrid MD  Professor and  Chair  Department of Dermatology  [facility]    ____________________________________________    CC: Hair Loss (Dariusz is having a virtual visit today for hair loss. He states \" it is getting a little better\")    HPI:  Mr. Dariusz Marshall is a(n) 26 year old male who presents today as a return patient for alopecia areata.  No new updates, he is continuing to work through approval process for xeljanz.  Has filled out his paperwork for free drug.  Hair/scalp is doing modestly improved.  Has mild regrowth in the posterior scalp.  No new areas " of hair loss.  Only change is that he started taking PREP since last visit.      Patient is otherwise feeling well, without additional skin concerns.    Labs Reviewed:  N/A    Physical Exam:  No photos provided.     Medications:  Current Outpatient Medications   Medication     aluminum chloride (DRYSOL) 20 % external solution     Biotin (BIOTIN MAXIMUM STRENGTH) 10 MG TABS tablet     clindamycin (CLEOCIN T) 1 % lotion     clobetasol (TEMOVATE) 0.05 % external solution     clobetasol propionate 0.05 % SHAM     ketoconazole (NIZORAL) 2 % shampoo     Minoxidil (ROGAINE MENS) 5 % FOAM     Multiple Vitamins-Minerals (MULTIVITAMIN & MINERAL PO)     sulfacetamide sodium-sulfur 10-5 % LOTN     Sulfacetamide-Sulfur-Cleanser (ROSADERM) 10-5 % KIT     Tazarotene (TAZORAC) 0.05 % CREA     tofacitinib (XELJANZ) 5 MG tablet     No current facility-administered medications for this visit.      Past Medical/Surgical History:   Patient Active Problem List   Diagnosis     AA (alopecia areata)     Focal hyperhidrosis     Autoimmune disease (H)     Acne vulgaris     Vitamin D deficiency     No past medical history on file.    CC Justin Guerra MD  [facility]  [address] [place] [address] on close of this encounter.

## 2022-02-26 NOTE — TELEPHONE ENCOUNTER
Free Drug Application Initiated  Medication:  Xeljanz  Sponsor:  [employer]   Phone #: [phone]  Fax #:  [phone]  Additional Information:   Faxed patient portion of PAP application to [employer]

## 2022-03-03 NOTE — TELEPHONE ENCOUNTER
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

## 2022-03-10 NOTE — TELEPHONE ENCOUNTER
Sent [employer] message to patient asking him to call [facility] if he's still interested in starting therapy.

## 2022-05-31 ENCOUNTER — LAB (OUTPATIENT)
Dept: LAB | Facility: CLINIC | Age: 27
End: 2022-05-31

## 2022-05-31 ENCOUNTER — OFFICE VISIT (OUTPATIENT)
Dept: DERMATOLOGY | Facility: CLINIC | Age: 27
End: 2022-05-31
Payer: COMMERCIAL

## 2022-05-31 VITALS — SYSTOLIC BLOOD PRESSURE: 142 MMHG | DIASTOLIC BLOOD PRESSURE: 83 MMHG

## 2022-05-31 DIAGNOSIS — M35.9 AUTOIMMUNE DISEASE (H): ICD-10-CM

## 2022-05-31 DIAGNOSIS — Z79.622 LONG-TERM CURRENT USE OF TOFACITINIB: Primary | ICD-10-CM

## 2022-05-31 DIAGNOSIS — Z51.81 MEDICATION MONITORING ENCOUNTER: ICD-10-CM

## 2022-05-31 DIAGNOSIS — L63.9 AA (ALOPECIA AREATA): ICD-10-CM

## 2022-05-31 LAB
ALBUMIN SERPL-MCNC: 3.6 G/DL (ref 3.4–5)
ALBUMIN UR-MCNC: NEGATIVE MG/DL
ALP SERPL-CCNC: 200 U/L (ref 40–150)
ALT SERPL W P-5'-P-CCNC: 472 U/L (ref 0–70)
AMORPH CRY #/AREA URNS HPF: ABNORMAL /HPF
ANION GAP SERPL CALCULATED.3IONS-SCNC: 5 MMOL/L (ref 3–14)
APPEARANCE UR: CLEAR
AST SERPL W P-5'-P-CCNC: 306 U/L (ref 0–45)
BASOPHILS # BLD MANUAL: 0 10E3/UL (ref 0–0.2)
BASOPHILS NFR BLD MANUAL: 0 %
BILIRUB SERPL-MCNC: 0.6 MG/DL (ref 0.2–1.3)
BILIRUB UR QL STRIP: NEGATIVE
BUN SERPL-MCNC: 12 MG/DL (ref 7–30)
CALCIUM SERPL-MCNC: 9 MG/DL (ref 8.5–10.1)
CHLORIDE BLD-SCNC: 105 MMOL/L (ref 94–109)
CHOLEST SERPL-MCNC: 149 MG/DL
CO2 SERPL-SCNC: 31 MMOL/L (ref 20–32)
COLOR UR AUTO: YELLOW
CREAT SERPL-MCNC: 1.06 MG/DL (ref 0.66–1.25)
EOSINOPHIL # BLD MANUAL: 0 10E3/UL (ref 0–0.7)
EOSINOPHIL NFR BLD MANUAL: 0 %
ERYTHROCYTE [DISTWIDTH] IN BLOOD BY AUTOMATED COUNT: 12.6 % (ref 10–15)
FASTING STATUS PATIENT QL REPORTED: NORMAL
GFR SERPL CREATININE-BSD FRML MDRD: >90 ML/MIN/1.73M2
GLUCOSE BLD-MCNC: 92 MG/DL (ref 70–99)
GLUCOSE UR STRIP-MCNC: NEGATIVE MG/DL
HCT VFR BLD AUTO: 42.2 % (ref 40–53)
HDLC SERPL-MCNC: 49 MG/DL
HGB BLD-MCNC: 14.6 G/DL (ref 13.3–17.7)
HGB UR QL STRIP: NEGATIVE
KETONES UR STRIP-MCNC: NEGATIVE MG/DL
LDLC SERPL CALC-MCNC: 84 MG/DL
LEUKOCYTE ESTERASE UR QL STRIP: NEGATIVE
LYMPHOCYTES # BLD MANUAL: 4.6 10E3/UL (ref 0.8–5.3)
LYMPHOCYTES NFR BLD MANUAL: 57 %
MCH RBC QN AUTO: 31.8 PG (ref 26.5–33)
MCHC RBC AUTO-ENTMCNC: 34.6 G/DL (ref 31.5–36.5)
MCV RBC AUTO: 92 FL (ref 78–100)
MONOCYTES # BLD MANUAL: 0.9 10E3/UL (ref 0–1.3)
MONOCYTES NFR BLD MANUAL: 11 %
MUCOUS THREADS #/AREA URNS LPF: PRESENT /LPF
NEUTROPHILS # BLD MANUAL: 2.6 10E3/UL (ref 1.6–8.3)
NEUTROPHILS NFR BLD MANUAL: 32 %
NITRATE UR QL: NEGATIVE
NONHDLC SERPL-MCNC: 100 MG/DL
PH UR STRIP: 7 [PH] (ref 5–7)
PLAT MORPH BLD: NORMAL
PLATELET # BLD AUTO: 144 10E3/UL (ref 150–450)
POTASSIUM BLD-SCNC: 4.4 MMOL/L (ref 3.4–5.3)
PROT SERPL-MCNC: 7.1 G/DL (ref 6.8–8.8)
RBC # BLD AUTO: 4.59 10E6/UL (ref 4.4–5.9)
RBC MORPH BLD: NORMAL
RBC URINE: <1 /HPF
SODIUM SERPL-SCNC: 141 MMOL/L (ref 133–144)
SP GR UR STRIP: 1.01 (ref 1–1.03)
TRIGL SERPL-MCNC: 81 MG/DL
UROBILINOGEN UR STRIP-MCNC: NORMAL MG/DL
WBC # BLD AUTO: 8 10E3/UL (ref 4–11)
WBC URINE: <1 /HPF

## 2022-05-31 PROCEDURE — 85027 COMPLETE CBC AUTOMATED: CPT | Performed by: PATHOLOGY

## 2022-05-31 PROCEDURE — 99214 OFFICE O/P EST MOD 30 MIN: CPT | Performed by: DERMATOLOGY

## 2022-05-31 PROCEDURE — 80061 LIPID PANEL: CPT | Performed by: PATHOLOGY

## 2022-05-31 PROCEDURE — 85007 BL SMEAR W/DIFF WBC COUNT: CPT | Performed by: PATHOLOGY

## 2022-05-31 PROCEDURE — 36415 COLL VENOUS BLD VENIPUNCTURE: CPT | Performed by: PATHOLOGY

## 2022-05-31 PROCEDURE — 81001 URINALYSIS AUTO W/SCOPE: CPT | Performed by: PATHOLOGY

## 2022-05-31 PROCEDURE — 80053 COMPREHEN METABOLIC PANEL: CPT | Performed by: PATHOLOGY

## 2022-05-31 RX ORDER — TRETINOIN 0.25 MG/G
CREAM TOPICAL
COMMUNITY
Start: 2021-10-06

## 2022-05-31 ASSESSMENT — PAIN SCALES - GENERAL: PAINLEVEL: NO PAIN (0)

## 2022-05-31 NOTE — LETTER
"5/31/2022      RE: Dariusz Marshall  [address] [place] [address]  [place] [address]       [facility] Dermatology Note  Encounter Date: May 31, 2022  Office Visit     Dermatology Problem List:  # Alopecia areata  - Stated Xeljanx mid March  - Current tx: Xeljanz, clobetasol solution, ketoconazole shampoo, minoxidil  - Prev. tx: intermittent ILK  - Labs: Xeljanz safety labs today  # History of hypovitaminosis D  ____________________________________________    Assessment & Plan:     # Non-scarring alopecia secondary to alopecia areata  - On Xeljanz since mid March. Has seen significant improvement since then on scalp, eyebrows, lashes and facial hair. Xeljanz labs taken today.     Follow-up: 4 month(s) in-person, or earlier for new or changing lesions    Staff and Scribe:      MAGGY TABARES, MS4  [facility] [employer]    Scribe Disclosure:  I, Rosalia Parikh, am serving as a scribe to document services personally performed by Nelly Madrid MD based on data collection and the provider's statements to me.     ____________________________________________    CC: Hair Loss (Dariusz is here to follow up for hair loss. He says things have been going \"really good\".)    HPI:  Mr. Dariusz Marshall is a 27 year old male who presents as a return patient for follow-up of hair loss, diagnosed as alopecia areata.   Since starting Xeljanz in mid March there has been significant improvement in the scalp, eyebrows, eyelashes, chin. He is very happy with results and hope they continue. No issues with compliance or adverse effects related to Xeljanz. Still using ketoconzazole shampoo every other day, clobetasol 3-4x per week, minoxidil daily.     - Had BP check yesterday and WNL.  - Last seen virtually on 2/15/22  - Current tx: xeljanz, clobetasol solution, ketoconazole shampoo, and minoxidil      Patient is otherwise feeling well, in usual state of health, and has no additional skin concerns today. "     Labs:  Xeljanz safety labs ordered today.    Physical Exam:  Vitals: BP (!) 142/83 (BP Location: Right arm)   GEN: Well developed, well-nourished, in no acute distress, in a pleasant mood.    SKIN: Focused examination of scalp and head was performed.  -Generalized increased hair density of the entire scalp  -Patchy areas of decreased hair density significantly improved since last visit. Slightly decreased hair density on the right temporal scalp and occipital scalp/nape.   - Improving patches of decreased hair density on bilateral lateral eyebrows  - No other lesions of concern on areas examined.         Medications:  Current Outpatient Medications   Medication     Biotin 10 MG TABS tablet     clindamycin (CLEOCIN T) 1 % lotion     clobetasol (TEMOVATE) 0.05 % external solution     emtricitabine-tenofovir (TRUVADA) 200-300 MG per tablet     ketoconazole (NIZORAL) 2 % shampoo     Minoxidil (ROGAINE MENS) 5 % FOAM     Multiple Vitamins-Minerals (MULTIVITAMIN & MINERAL PO)     tofacitinib (XELJANZ) 5 MG tablet     tretinoin (RETIN-A) 0.025 % external cream     aluminum chloride (DRYSOL) 20 % external solution     clobetasol propionate 0.05 % SHAM     sulfacetamide sodium-sulfur 10-5 % LOTN     Sulfacetamide-Sulfur-Cleanser (ROSADERM) 10-5 % KIT     Tazarotene (TAZORAC) 0.05 % CREA     No current facility-administered medications for this visit.      Past Medical History:   Patient Active Problem List   Diagnosis     AA (alopecia areata)     Focal hyperhidrosis     Autoimmune disease (H)     Acne vulgaris     Vitamin D deficiency     No past medical history on file.    CC Referred Self, MD  No address on file on close of this encounter.        Nelly Madrid MD

## 2022-05-31 NOTE — LETTER
5/31/2022       RE: Dariusz Marshall  [address] [place] [address]  [place] [address]     Dear Colleague,    Thank you for referring your patient, Dariusz Marshall, to the [facility] [place] at [facility]. Please see a copy of my visit note below.    [facility] Dermatology Note  Encounter Date: May 31, 2022  Office Visit     Dermatology Problem List:  # Alopecia areata  - Plan to initiate Xeljanz  - Current tx: clobetasol solution, ketoconazole shampoo, and intermittent ILK  - Labs: Hair labs and Xeljanz safety labs pending  # History of hypovitaminosis D  ____________________________________________    Assessment & Plan:     # Non-scarring alopecia secondary to alopecia areata  - still awaiting approval process for Xeljanz, otherwise stable.  We discussed again the risks and benefits of Xeljanz and will check in 5/31 after he has had some time for the medication to start working.  We will also plan for labs at that time.      Procedures Performed:   {kkprocedurenotes:934640}    Follow-up: {kkfollowup:225577}    Staff and Scribe:     Scribe Disclosure:  I, Rosalia Parikh, am serving as a scribe to document services personally performed by Nelly Madrid MD based on data collection and the provider's statements to me.     ***  ____________________________________________    CC: No chief complaint on file.    HPI:  Mr. Dariusz Marshall is a 27 year old male who presents as a return patient for follow-up of hair loss, diagnosed as alopecia areata.   - Last seen virtually on 2/15/22  - Shedding or thinning, or both: ***  - Current tx: xeljanz, clobetasol solution, ketoconazole shampoo, and intermittent ILK  - If using Rogaine, 1 cannister lasts how long: ***   - Scalp or hair care habits/products: ***  ***Yes/No Any new medications, supplements, or products? (please list below)     ***Yes/No Scalp pain   ***Yes/No Scalp burning   ***Yes/No Scalp  itching    ***Yes/No Eyebrow changes    ***Yes/No Eyelash changes   ***Yes/No Beard changes    ***Yes/No Other body hair changes    ***Yes/No Nail changes    ***Yes/No Additional symptoms? (please list below)     - Overall course: ***  - COVID status: ***yes/no/unknown, ***date(if known)    Patient is otherwise feeling well, in usual state of health, and has no additional skin concerns today.     Labs:  {kkreviewedlabs:834841} reviewed.    Physical Exam:  Vitals: There were no vitals taken for this visit.  GEN: Well developed, well-nourished, in no acute distress, in a pleasant mood.    SKIN: {kkSkinExam:366643}  - Mclean type: ***  - Jeyson part width of ***  - The layers of hair regrowth layers were noted to be  - *** cm for the first  - *** cm at the second  - *** cm at the third   - *** cm at the fourth   - *** diffuse erythema   - *** perifollicular erythema  - *** perifollicular scale   - *** scaling of the scalp   - *** negative hair pull test   - *** normal eyelash density  - *** normal eyebrow density  - *** no nail pitting or dystrophy   - *** scalp folliculitis/pustules   - In comparison to prior photographs, ***  - {Skin Exam Derm:406886}.   - No other lesions of concern on areas examined.     ***If FFA:  - R lateral forehead vein: elevated***/neutral***/depressed***  - Midline forehead vein: elevated***/neutral***/depressed***  - L ateral forehead vein: elevated***/neutral***/depressed***  - *** facial papules   - *** measurement of lateral canthus to lateral hairline   - *** measurement nasal bridge to anterior hairline    Medications:  Current Outpatient Medications   Medication     aluminum chloride (DRYSOL) 20 % external solution     Biotin (BIOTIN MAXIMUM STRENGTH) 10 MG TABS tablet     clindamycin (CLEOCIN T) 1 % lotion     clobetasol (TEMOVATE) 0.05 % external solution     clobetasol propionate 0.05 % SHAM     emtricitabine-tenofovir (TRUVADA) 200-300 MG per tablet     ketoconazole (NIZORAL)  2 % shampoo     Minoxidil (ROGAINE MENS) 5 % FOAM     Multiple Vitamins-Minerals (MULTIVITAMIN & MINERAL PO)     sulfacetamide sodium-sulfur 10-5 % LOTN     Sulfacetamide-Sulfur-Cleanser (ROSADERM) 10-5 % KIT     Tazarotene (TAZORAC) 0.05 % CREA     tofacitinib (XELJANZ) 5 MG tablet     No current facility-administered medications for this visit.      Past Medical History:   Patient Active Problem List   Diagnosis     AA (alopecia areata)     Focal hyperhidrosis     Autoimmune disease (H)     Acne vulgaris     Vitamin D deficiency     No past medical history on file.    CC Referred Self, MD  No address on file on close of this encounter.        Again, thank you for allowing me to participate in the care of your patient.      Sincerely,    Nelly Madrid MD

## 2022-05-31 NOTE — NURSING NOTE
"Dermatology Rooming Note    Dariusz Marshall's goals for this visit include:   Chief Complaint   Patient presents with     Hair Loss     Dariusz is here to follow up for hair loss. He says things have been going \"really good\".     Monserrat Mahoney, Facilitator    "

## 2022-05-31 NOTE — PROGRESS NOTES
"[facility] Dermatology Note  Encounter Date: May 31, 2022  Office Visit     Dermatology Problem List:  # Alopecia areata  - Stated Xeljanx mid March  - Current tx: Xeljanz, clobetasol solution, ketoconazole shampoo, minoxidil  - Prev. tx: intermittent ILK  - Labs: Xeljanz safety labs today  # History of hypovitaminosis D  ____________________________________________    Assessment & Plan:     # Non-scarring alopecia secondary to alopecia areata  - On Xeljanz since mid March. Has seen significant improvement since then on scalp, eyebrows, lashes and facial hair. Xeljanz labs taken today.     Follow-up: 4 month(s) in-person, or earlier for new or changing lesions    Staff and Scribe:      MAGGY TABARES, MS4  [facility] [employer]    Scribe Disclosure:  I, Rosalia Parikh, am serving as a scribe to document services personally performed by Nelly Madrid MD based on data collection and the provider's statements to me.     Provider Disclosure:   The documentation recorded by the scribe accurately reflects the services I personally performed and the decisions made by me.    Staff Physician:  I was present with the medical student who participated in the service and in the documentation of the note. I have verified the history and personally performed the physical exam and medical decision making. I agree with the assessment and plan of care as documented in the note.     Nelly Madrid MD  Professor and Chair  Department of Dermatology  [facility]: Phone: [phone], Fax:[phone]  [facility]: Phone: [phone], Fax: [phone]          ____________________________________________    CC: Hair Loss (Dariusz is here to follow up for hair loss. He says things have been going \"really good\".)    HPI:  Mr. Dariusz Marshall is a 27 year old male who presents as a " return patient for follow-up of hair loss, diagnosed as alopecia areata.   Since starting Xeljanz in mid March there has been significant improvement in the scalp, eyebrows, eyelashes, chin. He is very happy with results and hope they continue. No issues with compliance or adverse effects related to Xeljanz. Still using ketoconzazole shampoo every other day, clobetasol 3-4x per week, minoxidil daily.     - Had BP check yesterday and WNL.  - Last seen virtually on 2/15/22  - Current tx: xeljanz, clobetasol solution, ketoconazole shampoo, and minoxidil      Patient is otherwise feeling well, in usual state of health, and has no additional skin concerns today.     Labs:  Xeljanz safety labs ordered today.    Physical Exam:  Vitals: BP (!) 142/83 (BP Location: Right arm)   GEN: Well developed, well-nourished, in no acute distress, in a pleasant mood.    SKIN: Focused examination of scalp and head was performed.  -Generalized increased hair density of the entire scalp  -Patchy areas of decreased hair density significantly improved since last visit. Slightly decreased hair density on the right temporal scalp and occipital scalp/nape.   - Improving patches of decreased hair density on bilateral lateral eyebrows  - No other lesions of concern on areas examined.         Medications:  Current Outpatient Medications   Medication     Biotin 10 MG TABS tablet     clindamycin (CLEOCIN T) 1 % lotion     clobetasol (TEMOVATE) 0.05 % external solution     emtricitabine-tenofovir (TRUVADA) 200-300 MG per tablet     ketoconazole (NIZORAL) 2 % shampoo     Minoxidil (ROGAINE MENS) 5 % FOAM     Multiple Vitamins-Minerals (MULTIVITAMIN & MINERAL PO)     tofacitinib (XELJANZ) 5 MG tablet     tretinoin (RETIN-A) 0.025 % external cream     aluminum chloride (DRYSOL) 20 % external solution     clobetasol propionate 0.05 % SHAM     sulfacetamide sodium-sulfur 10-5 % LOTN     Sulfacetamide-Sulfur-Cleanser (ROSADERM) 10-5 % KIT     Tazarotene  (TAZORAC) 0.05 % CREA     No current facility-administered medications for this visit.      Past Medical History:   Patient Active Problem List   Diagnosis     AA (alopecia areata)     Focal hyperhidrosis     Autoimmune disease (H)     Acne vulgaris     Vitamin D deficiency     No past medical history on file.    CC Referred Self, MD  No address on file on close of this encounter.

## 2022-06-06 DIAGNOSIS — R79.9 ABNORMAL BLOOD CHEMISTRY TEST: Primary | ICD-10-CM

## 2022-07-01 ENCOUNTER — MYC MEDICAL ADVICE (OUTPATIENT)
Dept: DERMATOLOGY | Facility: CLINIC | Age: 27
End: 2022-07-01

## 2022-07-03 DIAGNOSIS — Z79.622 LONG-TERM CURRENT USE OF TOFACITINIB: Primary | ICD-10-CM

## 2022-07-04 NOTE — CONFIDENTIAL NOTE
Dariusz has now been off xelganz for a few weeks due to recent mono diagnosis and elevated liver function studies. Will repeat safety labs - orders placed in chart.    Nelly Madrid MD

## 2022-07-05 ENCOUNTER — TELEPHONE (OUTPATIENT)
Dept: DERMATOLOGY | Facility: CLINIC | Age: 27
End: 2022-07-05

## 2022-07-05 NOTE — TELEPHONE ENCOUNTER
Faxed lab orders to [facility] F: [phone]    Grace Cid, EMT

## 2022-07-07 ENCOUNTER — MYC MEDICAL ADVICE (OUTPATIENT)
Dept: DERMATOLOGY | Facility: CLINIC | Age: 27
End: 2022-07-07

## 2022-07-07 NOTE — TELEPHONE ENCOUNTER
Nelly Madrid MD Becker, Kristi, RN; [facility]  HI,   As in the subject line -   Not sure if lab forms need to faxed to [facility] or not.   Regards,   MH

## 2022-07-09 DIAGNOSIS — Z79.622 LONG-TERM CURRENT USE OF TOFACITINIB: Primary | ICD-10-CM

## 2022-07-10 NOTE — PROGRESS NOTES
Lab results have returned to normal overall. Recommended xelganz 5 mg daily for a couple of weeks, then twice a day as tolerated. Labs to be repeated in one month. Orders placed. Appointment scheduled for the fall of 2022.  Nelly Madrid MD

## 2022-08-05 ENCOUNTER — LAB REQUISITION (OUTPATIENT)
Dept: LAB | Facility: CLINIC | Age: 27
End: 2022-08-05

## 2022-08-05 PROCEDURE — 86481 TB AG RESPONSE T-CELL SUSP: CPT | Performed by: INTERNAL MEDICINE

## 2022-08-08 LAB
GAMMA INTERFERON BACKGROUND BLD IA-ACNC: 0.06 IU/ML
M TB IFN-G BLD-IMP: NEGATIVE
M TB IFN-G CD4+ BCKGRND COR BLD-ACNC: 9.94 IU/ML
MITOGEN IGNF BCKGRD COR BLD-ACNC: 0.01 IU/ML
MITOGEN IGNF BCKGRD COR BLD-ACNC: 0.14 IU/ML
QUANTIFERON MITOGEN: 10 IU/ML
QUANTIFERON NIL TUBE: 0.06 IU/ML
QUANTIFERON TB1 TUBE: 0.07 IU/ML
QUANTIFERON TB2 TUBE: 0.2

## 2022-10-10 ENCOUNTER — OFFICE VISIT (OUTPATIENT)
Dept: DERMATOLOGY | Facility: CLINIC | Age: 27
End: 2022-10-10

## 2022-10-10 ENCOUNTER — LAB (OUTPATIENT)
Dept: LAB | Facility: CLINIC | Age: 27
End: 2022-10-10

## 2022-10-10 VITALS — SYSTOLIC BLOOD PRESSURE: 134 MMHG | DIASTOLIC BLOOD PRESSURE: 80 MMHG

## 2022-10-10 DIAGNOSIS — L63.9 AA (ALOPECIA AREATA): ICD-10-CM

## 2022-10-10 DIAGNOSIS — M35.9 AUTOIMMUNE DISEASE (H): Primary | ICD-10-CM

## 2022-10-10 DIAGNOSIS — Z79.622 LONG-TERM CURRENT USE OF TOFACITINIB: ICD-10-CM

## 2022-10-10 DIAGNOSIS — Z51.81 MEDICATION MONITORING ENCOUNTER: ICD-10-CM

## 2022-10-10 LAB
ALBUMIN SERPL BCG-MCNC: 4.9 G/DL (ref 3.5–5.2)
ALBUMIN UR-MCNC: ABNORMAL MG/DL
ALP SERPL-CCNC: 78 U/L (ref 40–129)
ALT SERPL W P-5'-P-CCNC: 18 U/L (ref 10–50)
ANION GAP SERPL CALCULATED.3IONS-SCNC: 11 MMOL/L (ref 7–15)
APPEARANCE UR: CLEAR
AST SERPL W P-5'-P-CCNC: 21 U/L (ref 10–50)
BASOPHILS # BLD AUTO: 0 10E3/UL (ref 0–0.2)
BASOPHILS NFR BLD AUTO: 0 %
BILIRUB SERPL-MCNC: 1.3 MG/DL
BILIRUB UR QL STRIP: NEGATIVE
BUN SERPL-MCNC: 13.2 MG/DL (ref 6–20)
CALCIUM SERPL-MCNC: 9.9 MG/DL (ref 8.6–10)
CHLORIDE SERPL-SCNC: 100 MMOL/L (ref 98–107)
CHOLEST SERPL-MCNC: 192 MG/DL
COLOR UR AUTO: YELLOW
CREAT SERPL-MCNC: 0.95 MG/DL (ref 0.67–1.17)
DEPRECATED HCO3 PLAS-SCNC: 28 MMOL/L (ref 22–29)
EOSINOPHIL # BLD AUTO: 0.1 10E3/UL (ref 0–0.7)
EOSINOPHIL NFR BLD AUTO: 1 %
ERYTHROCYTE [DISTWIDTH] IN BLOOD BY AUTOMATED COUNT: 12.9 % (ref 10–15)
GFR SERPL CREATININE-BSD FRML MDRD: >90 ML/MIN/1.73M2
GLUCOSE SERPL-MCNC: 84 MG/DL (ref 70–99)
GLUCOSE UR STRIP-MCNC: NEGATIVE MG/DL
HCT VFR BLD AUTO: 45.2 % (ref 40–53)
HDLC SERPL-MCNC: 66 MG/DL
HGB BLD-MCNC: 15.8 G/DL (ref 13.3–17.7)
HGB UR QL STRIP: NEGATIVE
IMM GRANULOCYTES # BLD: 0 10E3/UL
IMM GRANULOCYTES NFR BLD: 0 %
KETONES UR STRIP-MCNC: 15 MG/DL
LDLC SERPL CALC-MCNC: 115 MG/DL
LEUKOCYTE ESTERASE UR QL STRIP: NEGATIVE
LYMPHOCYTES # BLD AUTO: 1.8 10E3/UL (ref 0.8–5.3)
LYMPHOCYTES NFR BLD AUTO: 20 %
MCH RBC QN AUTO: 31.8 PG (ref 26.5–33)
MCHC RBC AUTO-ENTMCNC: 35 G/DL (ref 31.5–36.5)
MCV RBC AUTO: 91 FL (ref 78–100)
MONOCYTES # BLD AUTO: 0.6 10E3/UL (ref 0–1.3)
MONOCYTES NFR BLD AUTO: 7 %
MUCOUS THREADS #/AREA URNS LPF: PRESENT /LPF
NEUTROPHILS # BLD AUTO: 6.1 10E3/UL (ref 1.6–8.3)
NEUTROPHILS NFR BLD AUTO: 72 %
NITRATE UR QL: NEGATIVE
NONHDLC SERPL-MCNC: 126 MG/DL
NRBC # BLD AUTO: 0 10E3/UL
NRBC BLD AUTO-RTO: 0 /100
PH UR STRIP: 6.5 [PH] (ref 5–7)
PLATELET # BLD AUTO: 222 10E3/UL (ref 150–450)
POTASSIUM SERPL-SCNC: 4.1 MMOL/L (ref 3.4–5.3)
PROT SERPL-MCNC: 7.8 G/DL (ref 6.4–8.3)
RBC # BLD AUTO: 4.97 10E6/UL (ref 4.4–5.9)
RBC URINE: 1 /HPF
SODIUM SERPL-SCNC: 139 MMOL/L (ref 136–145)
SP GR UR STRIP: 1.02 (ref 1–1.03)
TRIGL SERPL-MCNC: 57 MG/DL
UROBILINOGEN UR STRIP-MCNC: NORMAL MG/DL
WBC # BLD AUTO: 8.6 10E3/UL (ref 4–11)
WBC URINE: 8 /HPF

## 2022-10-10 PROCEDURE — 80053 COMPREHEN METABOLIC PANEL: CPT | Performed by: PATHOLOGY

## 2022-10-10 PROCEDURE — 11901 INJECT SKIN LESIONS >7: CPT | Performed by: DERMATOLOGY

## 2022-10-10 PROCEDURE — 85025 COMPLETE CBC W/AUTO DIFF WBC: CPT | Performed by: PATHOLOGY

## 2022-10-10 PROCEDURE — 36415 COLL VENOUS BLD VENIPUNCTURE: CPT | Performed by: PATHOLOGY

## 2022-10-10 PROCEDURE — 80061 LIPID PANEL: CPT | Mod: 90 | Performed by: PATHOLOGY

## 2022-10-10 PROCEDURE — 99214 OFFICE O/P EST MOD 30 MIN: CPT | Mod: 25 | Performed by: DERMATOLOGY

## 2022-10-10 PROCEDURE — 81001 URINALYSIS AUTO W/SCOPE: CPT | Performed by: PATHOLOGY

## 2022-10-10 PROCEDURE — 99000 SPECIMEN HANDLING OFFICE-LAB: CPT | Performed by: PATHOLOGY

## 2022-10-10 RX ORDER — CLOBETASOL PROPIONATE 0.5 MG/ML
SOLUTION TOPICAL
Qty: 50 ML | Refills: 3 | Status: SHIPPED | OUTPATIENT
Start: 2022-10-10 | End: 2023-09-16

## 2022-10-10 RX ORDER — CLINDAMYCIN PHOSPHATE 10 UG/ML
LOTION TOPICAL DAILY
Qty: 60 ML | Refills: 12 | Status: CANCELLED | OUTPATIENT
Start: 2022-10-10

## 2022-10-10 ASSESSMENT — PAIN SCALES - GENERAL: PAINLEVEL: NO PAIN (0)

## 2022-10-10 NOTE — PROGRESS NOTES
[facility] Summary (10/10/2022)    Frontal anterior    Mid scalp    Vertex    Occipital    Right temporal    Left temporal    Summary

## 2022-10-10 NOTE — LETTER
10/10/2022       RE: Dariusz Marshall  [address] [place] [address]  [place] [address]     Dear Colleague,    Thank you for referring your patient, Dariusz Marshall, to the [facility] [place] at [facility]. Please see a copy of my visit note below.    [facility] Dermatology Note  Encounter Date: Oct 10, 2022  Office Visit     Dermatology Problem List:  1. Alopecia areata  - Stated Xeljanx mid March  - Current tx: Xeljanz, clobetasol solution, ketoconazole shampoo, minoxidil  - Prev. tx: intermittent ILK  - Labs: Xeljanz safety labs today  2. History of hypovitaminosis D  ____________________________________________    Assessment & Plan:     # Non-scarring alopecia secondary to alopecia areata  - On Xeljanz since mid March. Has seen significant improvement since then on scalp, eyebrows, lashes and facial hair. Xeljanz labs taken today.     Procedures Performed:   {kkprocedurenotes:981085}    Follow-up: {kkfollowup:350294}    Staff and Scribe:     Scribe Disclosure:  I, Israel Dewey, am serving as a scribe to document services personally performed by Nelly Madrid MD based on data collection and the provider's statements to me.     ***  ____________________________________________    CC: No chief complaint on file.    HPI:  Mr. Dariusz Marshall is a 27 year old male who presents as a return patient for follow-up of hair loss, diagnosed as non-scarring alopecia secondary to alopecia areata.   - Last seen in-clinic on 5/31/2022  - Shedding or thinning, or both: ***  - Current tx: ***Xeljanz, clobetasol solution, ketoconazole shampoo, minoxidil  - If using Rogaine, 1 cannister lasts how long: ***   - Scalp or hair care habits/products: ***  ***Yes/No Any new medications, supplements, or products? (please list below)     ***Yes/No Scalp pain   ***Yes/No Scalp burning   ***Yes/No Scalp itching    ***Yes/No Eyebrow changes    ***Yes/No  Eyelash changes   ***Yes/No Beard changes    ***Yes/No Other body hair changes    ***Yes/No Nail changes    ***Yes/No Additional symptoms? (please list below)     - Overall course: ***  - COVID status: ***yes/no/unknown, ***date(if known)    Patient is otherwise feeling well, in usual state of health, and has no additional skin concerns today.      {kkROSoptional:736024}    Labs:  {kkreviewedlabs:642816} reviewed.    Physical Exam:  Vitals: There were no vitals taken for this visit.  GEN: Well developed, well-nourished, in no acute distress, in a pleasant mood.    SKIN: {kkSkinExam:221641}  - Mclean type: ***  - Jeyson part width of ***  - The layers of hair regrowth layers were noted to be  - *** cm for the first  - *** cm at the second  - *** cm at the third   - *** cm at the fourth   - *** diffuse erythema   - *** perifollicular erythema  - *** perifollicular scale   - *** scaling of the scalp   - *** negative hair pull test   - *** normal eyelash density  - *** normal eyebrow density  - *** no nail pitting or dystrophy   - *** scalp folliculitis/pustules   - In comparison to prior photographs, ***  - {Skin Exam Derm:893929}.   - No other lesions of concern on areas examined.     ***If FFA:  - R lateral forehead vein: elevated***/neutral***/depressed***  - Midline forehead vein: elevated***/neutral***/depressed***  - L ateral forehead vein: elevated***/neutral***/depressed***  - *** facial papules   - *** measurement of lateral canthus to lateral hairline   - *** measurement nasal bridge to anterior hairline    Medications:  Current Outpatient Medications   Medication     aluminum chloride (DRYSOL) 20 % external solution     Biotin 10 MG TABS tablet     clindamycin (CLEOCIN T) 1 % lotion     clobetasol (TEMOVATE) 0.05 % external solution     clobetasol propionate 0.05 % SHAM     emtricitabine-tenofovir (TRUVADA) 200-300 MG per tablet     ketoconazole (NIZORAL) 2 % shampoo     Minoxidil (ROGAINE MENS) 5 %  FOAM     Multiple Vitamins-Minerals (MULTIVITAMIN & MINERAL PO)     sulfacetamide sodium-sulfur 10-5 % LOTN     Sulfacetamide-Sulfur-Cleanser (ROSADERM) 10-5 % KIT     Tazarotene (TAZORAC) 0.05 % CREA     tofacitinib (XELJANZ) 5 MG tablet     tretinoin (RETIN-A) 0.025 % external cream     No current facility-administered medications for this visit.      Past Medical History:   Patient Active Problem List   Diagnosis     AA (alopecia areata)     Focal hyperhidrosis     Autoimmune disease (H)     Acne vulgaris     Vitamin D deficiency     No past medical history on file.    CC Referred Self, MD  No address on file on close of this encounter.      Again, thank you for allowing me to participate in the care of your patient.      Sincerely,    Nelly Madrid MD

## 2022-10-10 NOTE — NURSING NOTE
Dermatology Rooming Note    Dariusz Marshall's goals for this visit include:   Chief Complaint   Patient presents with     Hair Loss     Dariusz is here for a HL follow-up. States condition has worsened since last seen.     Solomon García, EMT

## 2022-10-10 NOTE — NURSING NOTE
Drug Administration Record    Prior to injection, verified patient identity using patient's name and date of birth.  Due to injection administration, patient instructed to remain in clinic for 15 minutes  afterwards, and to report any adverse reaction to me immediately.    Drug Name: triamcinolone acetonide(kenalog)  Dose: 2.0mL of triamcinolone 10mg/mL, 20mg dose  Route administered: ID  NDC #: Kenalog-10 (6583-0981-87)  Amount of waste(mL):3.0mL  Reason for waste: Multi dose vial    LOT #: [id number]  SITE: See Provider Note  : [employer]  EXPIRATION DATE: APR2024

## 2022-10-10 NOTE — PROGRESS NOTES
[facility] Dermatology Note  Encounter Date: Oct 10, 2022  Office Visit     Dermatology Problem List:  1. Alopecia areata  - Stated Xeljanx mid March  - Current tx: Xeljanz, clobetasol solution, ketoconazole shampoo, minoxidil  - Prev. tx: intermittent ILK  - Labs: Xeljanz safety labs today  - HairMetrix 10/10/22    2. History of hypovitaminosis D    3. History of Santos's disease, biopsy completed at 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   - On Xeljanz since mid March. Has seen significant improvement since then on scalp, eyebrows, lashes and facial hair. Xeljanz labs taken today.   - Reduce clobetasol solution to every other day  - Continue ketoconazole 2% shampoo  - ILK today, see procedure below  - HairMetrix completed 10/10/22    #Santos's disease, chest  -triamcinolone cream prn  -ketoconazole shampoo chest as well    Procedures Performed:   - Intra-lesional triamcinolone procedure note. After verbal consent and review of risk of pain and skin thinning/atrophy, positioning and cleansing with isopropyl alcohol, 2 total mL of triamcinolone 10 mg/mL was injected into 20 lesion(s) on the scalp. The patient tolerated the procedure well and left the dermatology clinic in good condition.    Hair Metrix:   - Frontal Scalp: 150, mild to moderate loose scale   - Mid-Scalp: 167 mild perifollicular and loose scale   - Vertex Scalp: 193, minor perifollicular and loose scale   - Occipital Scalp: 144, minor perifollicular and loose scale   - Right Temporal Scalp: 116 minor scale   - Left Temporal Scalp: 130 minor loose scale     Follow-up: 3 month(s) in-person, or earlier for new or changing lesions    Staff and Scribe:     Jonnathan Cotto,  MS4    Scribe Disclosure:  I, Israel Maco, am serving as a scribe to document services personally performed by Nelly Madrid MD based on data collection and the provider's statements to me.    Provider Disclosure:   The documentation recorded by the scribe accurately reflects the services I personally performed and the decisions made by me.    Staff Physician:  I was present with the medical student who participated in the service and in the documentation of the note. I have verified the history and personally performed the physical exam and medical decision making. I agree with the assessment and plan of care as documented in the note.  ILK injections were primarily done by me.      Nelly Madrid MD  Professor and Chair  Department of Dermatology  [facility]: Phone: [phone], Fax:[phone]  [facility]: Phone: [phone], Fax: [phone]  11/13/2022      ____________________________________________    CC: hair loss follow-up    HPI:  Mr. Dariusz Marshall is a 27 year old male who presents as a return patient for follow-up of hair loss, diagnosed as non-scarring alopecia secondary to alopecia areata.   - Last seen in-clinic on 5/31/2022  - Shedding or thinning, or both: both, thinning > shedding  - Current tx: Xeljanz, clobetasol solution, ketoconazole shampoo, minoxidil  - If using Rogaine, 1 cannister lasts how long: n/a  - Scalp or hair care habits/products: see below  No Any new medications, supplements, or products? (please list below)     No Scalp pain   No Scalp burning   No Scalp itching    No Eyebrow changes    No Eyelash changes   No Beard changes    No Other body hair changes    No Nail changes    No Additional symptoms? (please list below)     - Overall course: significant improvement since January, slightly worse since May  - COVID status: unknown    Reports slight  hair thining and shedding at top of scalp. Also notices some bald spots on posterior inferior scalp. Uses clobetasol solution 5x/week and uses ketoconazole shampoo every other day. Restarted Xeljanz in early July after Xeljanz labs showed improvement since June in liver enzymes. Reports no RUQ pain, n/v/d. Reports was asymptomatic when labs were elevated and remains well. He is interested if finasteride is an appropriate medication for his hair loss. He is open to ILK injections today. Reports male-pattern hair loss in father.    Patient is otherwise feeling well, in usual state of health, and has no additional skin concerns today.      Labs:  None reviewed.    Physical Exam:  Vitals: There were no vitals taken for this visit.  GEN: Well developed, well-nourished, in no acute distress, in a pleasant mood.    SKIN: Focused examination of scalp and face was performed. HairMetrix completed today, see attached.  -beard full and symmetrical  -6 patches of hair loss without hair growth on posterior inferior scalp, largest the size of a thumbprint  - no diffuse erythema   - no perifollicular erythema  - no perifollicular scale   - no scaling of the scalp   - negative hair pull test   - normal eyelash density  - normal eyebrow density  - no nail pitting or dystrophy   - no scalp folliculitis/pustules   - No other lesions of concern on areas examined.     Medications:  Current Outpatient Medications   Medication     aluminum chloride (DRYSOL) 20 % external solution     Biotin 10 MG TABS tablet     clindamycin (CLEOCIN T) 1 % lotion     clobetasol (TEMOVATE) 0.05 % external solution     clobetasol propionate 0.05 % SHAM     emtricitabine-tenofovir (TRUVADA) 200-300 MG per tablet     ketoconazole (NIZORAL) 2 % shampoo     Minoxidil (ROGAINE MENS) 5 % FOAM     Multiple Vitamins-Minerals (MULTIVITAMIN & MINERAL PO)     sulfacetamide sodium-sulfur 10-5 % LOTN     Sulfacetamide-Sulfur-Cleanser (ROSADERM) 10-5 % KIT     Tazarotene  (TAZORAC) 0.05 % CREA     tofacitinib (XELJANZ) 5 MG tablet     tretinoin (RETIN-A) 0.025 % external cream     No current facility-administered medications for this visit.      Past Medical History:   Patient Active Problem List   Diagnosis     AA (alopecia areata)     Focal hyperhidrosis     Autoimmune disease (H)     Acne vulgaris     Vitamin D deficiency     No past medical history on file.    CC Referred Self, MD  No address on file on close of this encounter.

## 2022-11-02 ENCOUNTER — TELEPHONE (OUTPATIENT)
Dept: DERMATOLOGY | Facility: CLINIC | Age: 27
End: 2022-11-02

## 2022-11-04 NOTE — TELEPHONE ENCOUNTER
Free Drug Application Initiated  Medication: Xeljanz  Sponsor: [employer]  Phone #: [phone]   Fax #: [phone]  Additional Information: faxed provider portion of PAP

## 2022-11-11 NOTE — TELEPHONE ENCOUNTER
Free Drug Application Approved  Effective Dates: 3/10/23  Patient notified: yes, ofe called patient  Additional Information:

## 2022-11-21 ENCOUNTER — HEALTH MAINTENANCE LETTER (OUTPATIENT)
Age: 27
End: 2022-11-21

## 2022-12-25 ENCOUNTER — HEALTH MAINTENANCE LETTER (OUTPATIENT)
Age: 27
End: 2022-12-25

## 2023-01-19 ENCOUNTER — TELEPHONE (OUTPATIENT)
Dept: DERMATOLOGY | Facility: CLINIC | Age: 28
End: 2023-01-19
Payer: COMMERCIAL

## 2023-01-19 NOTE — TELEPHONE ENCOUNTER
LVM requesting patient call back.    Calling patient to reschedule appointment with Dr. Madrid on 5/2 to 5/2/2023 @2:40pm and change location to [facility].    Solomon García EMT

## 2023-01-19 NOTE — TELEPHONE ENCOUNTER
[facility] Call Center    Phone Message    May a detailed message be left on voicemail: yes     Reason for Call: Other: Patient says 05/02/2023 @2:40 in [place] is fine. Writer unable to reschedule   Please reschedule  Thank you    Action Taken: Message routed to:  [facility] Derm    Travel Screening: Not Applicable

## 2023-02-22 ENCOUNTER — TELEPHONE (OUTPATIENT)
Dept: DERMATOLOGY | Facility: CLINIC | Age: 28
End: 2023-02-22
Payer: COMMERCIAL

## 2023-03-02 NOTE — TELEPHONE ENCOUNTER
Patient's Pf[employer] Patient Assistance Program application has been completed and faxed today: [employer]    Fax number: [phone]  Phone: [phone]    PAP application form was sent to HIM for scanning.    Lesley Apodaca LPN

## 2023-03-06 NOTE — TELEPHONE ENCOUNTER
Spoke with ofe and they didn't receive provider portion of pap from clinic. Sent message to have it resent.

## 2023-03-08 NOTE — TELEPHONE ENCOUNTER
[employer] received provider portion but are in need of patient portion. Sent my chart message to patient with link to application.

## 2023-03-30 NOTE — TELEPHONE ENCOUNTER
Spoke with patient, he forgot about the application. He will work on getting it submitted to [employer]. Will check back next week for status update.

## 2023-04-12 NOTE — TELEPHONE ENCOUNTER
[employer] still missing patient portion. Sent my chart message to patient to follow up. Please address at next appointment. Unable to move forward until patient submits their portion. Closing encounter.

## 2023-05-02 ENCOUNTER — OFFICE VISIT (OUTPATIENT)
Dept: DERMATOLOGY | Facility: CLINIC | Age: 28
End: 2023-05-02
Payer: COMMERCIAL

## 2023-05-02 VITALS — HEART RATE: 60 BPM | DIASTOLIC BLOOD PRESSURE: 76 MMHG | SYSTOLIC BLOOD PRESSURE: 140 MMHG

## 2023-05-02 DIAGNOSIS — L63.9 AA (ALOPECIA AREATA): ICD-10-CM

## 2023-05-02 DIAGNOSIS — Z79.622 LONG-TERM CURRENT USE OF TOFACITINIB: ICD-10-CM

## 2023-05-02 DIAGNOSIS — L11.1 GROVER'S DISEASE: Chronic | ICD-10-CM

## 2023-05-02 DIAGNOSIS — Z51.81 MEDICATION MONITORING ENCOUNTER: ICD-10-CM

## 2023-05-02 DIAGNOSIS — M35.9 AUTOIMMUNE DISEASE (H): Primary | ICD-10-CM

## 2023-05-02 PROCEDURE — 99214 OFFICE O/P EST MOD 30 MIN: CPT | Performed by: DERMATOLOGY

## 2023-05-02 RX ORDER — TRIAMCINOLONE ACETONIDE 1 MG/G
CREAM TOPICAL 2 TIMES DAILY
Qty: 60 G | Refills: 3 | Status: SHIPPED | OUTPATIENT
Start: 2023-05-02

## 2023-05-02 RX ORDER — KETOCONAZOLE 20 MG/ML
SHAMPOO TOPICAL
Qty: 120 ML | Refills: 9 | Status: SHIPPED | OUTPATIENT
Start: 2023-05-02 | End: 2023-09-16

## 2023-05-02 RX ORDER — EMTRICITABINE AND TENOFOVIR ALAFENAMIDE 200; 25 MG/1; MG/1
1 TABLET ORAL
COMMUNITY
Start: 2023-04-24

## 2023-05-02 ASSESSMENT — PAIN SCALES - GENERAL: PAINLEVEL: NO PAIN (0)

## 2023-05-02 NOTE — PATIENT INSTRUCTIONS
[facility] Dermatology in 4-5 months (for microneedling/ILK shots if needed)  -Restart Ketoconazole shampoo every other day  -Continue xeljanz   -Continue minoxidil solution  -Restart clobetasol on areas on scalp with most hair loss, every other day.

## 2023-05-02 NOTE — LETTER
5/2/2023       RE: Dariusz Marshall  [address]  [place] [address]     Dear Colleague,    Thank you for referring your patient, Dariusz Marshall, to the [facility] [place] [facility]. Please see a copy of my visit note below.    [facility] Dermatology Note  Encounter Date: May 2, 2023  Office Visit     Dermatology Problem List:  1. Alopecia areata  - Current tx: Xeljanz (Started mid march 2022),minoxidil solution, ketoconazole shampoo, clobetasol  - Prev. tx: intermittent ILK  - Labs: Xeljanz safety labs, last ordered 5/2/23  - HairMetrix 10/10/22     2. History of hypovitaminosis D     3. History of [place]'s disease, biopsy completed at 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 Had a period of significant hair loss, following mononucleosis in summer (and stopping xeljanz). Since restarting xeljanz, he has noticed significant regrowth, especially on temporal scalp. Last xeljanz labs 10/10/22.   On exam, some papules and erythema noticeable, encouraged restarting ketoconazole as soon as he gets refill  -Repeat labs today  -Patient interested in microneedling. Discussed that this could be done in 4-5 months in [facility] if no significant improvement in hair regrowth. One side microneedling, other side with ILK injections  - Continue ketoconazole 2% shampoo every other day, refilled today  -Continue Xeljanz BID  -Continue minoxidil topical solution every day  -Restart clobetasol on area on vertex scalp with most hair loss  - HairMetrix completed 10/10/22     #Callaway's disease, chest; flaring up now  -triamcinolone cream prn, refilled today  -ketoconazole shampoo chest as well    #Elevated blood pressure value, 140/76  -Recommended  re-checking at home, outside of clinic. If continued elevated blood pressures, recommend seeing PCP    Procedures Performed:   N/A    Follow-up: 4-5 month(s) in-person, or earlier for new or changing lesions    Staff and Medical Student:     Keyana Ring, medical student, [employer]    Seen and staffed with Dr. Madrid    Staff Physician:  I was present with the medical student who participated in the service and in the documentation of the note. I have verified the history and personally performed the physical exam and medical decision making. I agree with the assessment and plan of care as documented in the note.           Nelly Madrid MD  Professor and Chair  Department of Dermatology  [facility]: Phone: [phone], Fax:[phone]  [facility]: Phone: [phone], Fax: [phone]    ____________________________________________    CC: No chief complaint on file.    HPI:  Mr. Dariusz Marshall is a(n) 28 year old male who presents today as a return patient for alopecia areata follow up.    Last seen: 10/10/22  Current tx: Xeljanz, minoxidil solution daily, ketoconazole shampoo every other day  Shedding or thinning, or both: Continued shedding, thinning mostly on the top of the head  Other hair products: H&S shampoo    No Any new medications, supplements, or products? (please list below)      No Scalp pain   No Scalp burning   No Scalp itching    No Eyebrow changes    No Eyelash changes   No Beard changes    No Other body hair changes    No Nail changes    No Additional symptoms? (please list below)      - Overall course: Overall, he seems to have noticed a lot of regrowth on his scalp. He had a period of significant hair loss following episode of mononucleosis last summer, after which he also stopped xeljanz, but he has now noticed new hair growing into the patches which suffered most hair  loss. He did mention he thinks there may be a newer growing spot of hair loss on the vertex of his head, but the sides seem to be growing in very well. His eyebrows, eyelashes and beard have been good. He is currently taking xeljanz BID, minoxidil solution daily. He was also doing ketoconazole shampoo every other day until he ran out of refills, and has since been doing Head and shoulders shampoo. No increased scale or shampoo.     He has noticed a flare up in his [place]'s disease, but did not have any triamcinolone cream any more. Red papules with blistering, that sometimes eroded.    Patient is otherwise feeling well, without additional skin concerns.    Labs:  CMP, CBC, UA, Lipids ordered today    Physical Exam:  Vitals: There were no vitals taken for this visit.  SKIN: Focused examination of scalp and face was performed.    - No diffuse erythema   - Mild perifollicular erythema  - No perifollicular scale   - No scaling of the scalp   - Negative hair pull test   - Normal eyelash density  - Some decreased eyebrow density on right distal eyebrow  - No paronychia of all fingernails   -Some scalp folliculitis/pustules     SALT score:  -top: 5%  -left: 8%  -right: 10%  -back 15%  TOTAL 38%     -Fine, light vellous hairs of regrowth on temporal alopecia patches.    - No other lesions of concern on areas examined.     Medications:  Current Outpatient Medications   Medication    aluminum chloride (DRYSOL) 20 % external solution    Biotin 10 MG TABS tablet    clindamycin (CLEOCIN T) 1 % lotion    clobetasol (TEMOVATE) 0.05 % external solution    clobetasol propionate 0.05 % SHAM    emtricitabine-tenofovir (TRUVADA) 200-300 MG per tablet    ketoconazole (NIZORAL) 2 % shampoo    Minoxidil (ROGAINE MENS) 5 % FOAM    Multiple Vitamins-Minerals (MULTIVITAMIN & MINERAL PO)    sulfacetamide sodium-sulfur 10-5 % LOTN    Sulfacetamide-Sulfur-Cleanser (ROSADERM) 10-5 % KIT    Tazarotene (TAZORAC) 0.05 % CREA    tofacitinib  (XELJANZ) 5 MG tablet    tretinoin (RETIN-A) 0.025 % external cream     No current facility-administered medications for this visit.      Past Medical History:   Patient Active Problem List   Diagnosis    AA (alopecia areata)    Focal hyperhidrosis    Autoimmune disease (H)    Acne vulgaris    Vitamin D deficiency     No past medical history on file.     CC No referring provider defined for this encounter. on close of this encounter.      Again, thank you for allowing me to participate in the care of your patient.      Sincerely,    Nelly Madrid MD

## 2023-05-02 NOTE — PROGRESS NOTES
[facility] Dermatology Note  Encounter Date: May 2, 2023  Office Visit     Dermatology Problem List:  1. Alopecia areata  - Current tx: Xeljanz (Started mid march 2022),minoxidil solution, ketoconazole shampoo, clobetasol  - Prev. tx: intermittent ILK  - Labs: Xeljanz safety labs, last ordered 5/2/23  - HairMetrix 10/10/22     2. History of hypovitaminosis D     3. History of [place]'s disease, biopsy completed at [facility]  -triamcinolone cream  -ketoconazole shampoo    ____________________________________________    Assessment & Plan:    # Non-scarring alopecia secondary to alopecia areata  On Xeljanz since mid March 2022. Had a period of significant hair loss, following mononucleosis in summer (and stopping xeljanz). Since restarting xeljanz, he has noticed significant regrowth, especially on temporal scalp. Last xeljanz labs 10/10/22.   On exam, some papules and erythema noticeable, encouraged restarting ketoconazole as soon as he gets refill  -Repeat labs today  -Patient interested in microneedling. Discussed that this could be done in 4-5 months in [facility] if no significant improvement in hair regrowth. One side microneedling, other side with ILK injections  - Continue ketoconazole 2% shampoo every other day, refilled today  -Continue Xeljanz BID  -Continue minoxidil topical solution every day  -Restart clobetasol on area on vertex scalp with most hair loss  - HairMetrix completed 10/10/22     #Santos's disease, chest; flaring up now  -triamcinolone cream prn, refilled today  -ketoconazole shampoo chest as well    #Elevated blood pressure value, 140/76  -Recommended re-checking at home, outside of clinic. If continued elevated blood pressures, recommend seeing PCP    Procedures Performed:   N/A    Follow-up: 4-5 month(s) in-person, or earlier for new or changing lesions    Staff and Medical Student:     Keyana Ring, medical student, [employer]    Seen and  staffed with Dr. Madrid    Staff Physician:  I was present with the medical student who participated in the service and in the documentation of the note. I have verified the history and personally performed the physical exam and medical decision making. I agree with the assessment and plan of care as documented in the note.           Nelly Madrid MD  Professor and Chair  Department of Dermatology  [facility]: Phone: [phone], Fax:[phone]  [facility]: Phone: [phone], Fax: [phone]    ____________________________________________    CC: No chief complaint on file.    HPI:  Mr. Dariusz Marshall is a(n) 28 year old male who presents today as a return patient for alopecia areata follow up.    Last seen: 10/10/22  Current tx: Xeljanz, minoxidil solution daily, ketoconazole shampoo every other day  Shedding or thinning, or both: Continued shedding, thinning mostly on the top of the head  Other hair products: H&S shampoo    No Any new medications, supplements, or products? (please list below)      No Scalp pain   No Scalp burning   No Scalp itching    No Eyebrow changes    No Eyelash changes   No Beard changes    No Other body hair changes    No Nail changes    No Additional symptoms? (please list below)      - Overall course: Overall, he seems to have noticed a lot of regrowth on his scalp. He had a period of significant hair loss following episode of mononucleosis last summer, after which he also stopped xeljanz, but he has now noticed new hair growing into the patches which suffered most hair loss. He did mention he thinks there may be a newer growing spot of hair loss on the vertex of his head, but the sides seem to be growing in very well. His eyebrows, eyelashes and beard have been good. He is currently taking xeljanz BID, minoxidil solution daily. He was also doing ketoconazole shampoo every  other day until he ran out of refills, and has since been doing Head and shoulders shampoo. No increased scale or shampoo.     He has noticed a flare up in his [place]'s disease, but did not have any triamcinolone cream any more. Red papules with blistering, that sometimes eroded.    Patient is otherwise feeling well, without additional skin concerns.    Labs:  CMP, CBC, UA, Lipids ordered today    Physical Exam:  Vitals: There were no vitals taken for this visit.  SKIN: Focused examination of scalp and face was performed.    - No diffuse erythema   - Mild perifollicular erythema  - No perifollicular scale   - No scaling of the scalp   - Negative hair pull test   - Normal eyelash density  - Some decreased eyebrow density on right distal eyebrow  - No paronychia of all fingernails   -Some scalp folliculitis/pustules     SALT score:  -top: 5%  -left: 8%  -right: 10%  -back 15%  TOTAL 38%     -Fine, light vellous hairs of regrowth on temporal alopecia patches.    - No other lesions of concern on areas examined.     Medications:  Current Outpatient Medications   Medication    aluminum chloride (DRYSOL) 20 % external solution    Biotin 10 MG TABS tablet    clindamycin (CLEOCIN T) 1 % lotion    clobetasol (TEMOVATE) 0.05 % external solution    clobetasol propionate 0.05 % SHAM    emtricitabine-tenofovir (TRUVADA) 200-300 MG per tablet    ketoconazole (NIZORAL) 2 % shampoo    Minoxidil (ROGAINE MENS) 5 % FOAM    Multiple Vitamins-Minerals (MULTIVITAMIN & MINERAL PO)    sulfacetamide sodium-sulfur 10-5 % LOTN    Sulfacetamide-Sulfur-Cleanser (ROSADERM) 10-5 % KIT    Tazarotene (TAZORAC) 0.05 % CREA    tofacitinib (XELJANZ) 5 MG tablet    tretinoin (RETIN-A) 0.025 % external cream     No current facility-administered medications for this visit.      Past Medical History:   Patient Active Problem List   Diagnosis    AA (alopecia areata)    Focal hyperhidrosis    Autoimmune disease (H)    Acne vulgaris    Vitamin D deficiency      No past medical history on file.     CC No referring provider defined for this encounter. on close of this encounter.

## 2023-05-02 NOTE — NURSING NOTE
Dermatology Rooming Note    Dariusz Marshall's goals for this visit include:   Chief Complaint   Patient presents with     Hair Loss     HL Follow-up: He believes there is more regrowth than loss.     Lesley Apodaca LPN

## 2023-05-03 ENCOUNTER — LAB (OUTPATIENT)
Dept: LAB | Facility: CLINIC | Age: 28
End: 2023-05-03
Payer: COMMERCIAL

## 2023-05-03 DIAGNOSIS — L63.9 AA (ALOPECIA AREATA): ICD-10-CM

## 2023-05-03 DIAGNOSIS — M35.9 AUTOIMMUNE DISEASE (H): ICD-10-CM

## 2023-05-03 DIAGNOSIS — Z51.81 MEDICATION MONITORING ENCOUNTER: ICD-10-CM

## 2023-05-03 LAB
ALBUMIN SERPL BCG-MCNC: 4.6 G/DL (ref 3.5–5.2)
ALBUMIN UR-MCNC: NEGATIVE MG/DL
ALP SERPL-CCNC: 56 U/L (ref 40–129)
ALT SERPL W P-5'-P-CCNC: 29 U/L (ref 10–50)
ANION GAP SERPL CALCULATED.3IONS-SCNC: 8 MMOL/L (ref 7–15)
APPEARANCE UR: CLEAR
AST SERPL W P-5'-P-CCNC: 41 U/L (ref 10–50)
BASOPHILS # BLD AUTO: 0 10E3/UL (ref 0–0.2)
BASOPHILS NFR BLD AUTO: 1 %
BILIRUB SERPL-MCNC: 0.7 MG/DL
BILIRUB UR QL STRIP: NEGATIVE
BUN SERPL-MCNC: 12.2 MG/DL (ref 6–20)
CALCIUM SERPL-MCNC: 9.3 MG/DL (ref 8.6–10)
CHLORIDE SERPL-SCNC: 103 MMOL/L (ref 98–107)
CHOLEST SERPL-MCNC: 202 MG/DL
COLOR UR AUTO: ABNORMAL
CREAT SERPL-MCNC: 0.96 MG/DL (ref 0.67–1.17)
DEPRECATED HCO3 PLAS-SCNC: 28 MMOL/L (ref 22–29)
EOSINOPHIL # BLD AUTO: 0 10E3/UL (ref 0–0.7)
EOSINOPHIL NFR BLD AUTO: 1 %
ERYTHROCYTE [DISTWIDTH] IN BLOOD BY AUTOMATED COUNT: 11.7 % (ref 10–15)
GFR SERPL CREATININE-BSD FRML MDRD: >90 ML/MIN/1.73M2
GLUCOSE SERPL-MCNC: 85 MG/DL (ref 70–99)
GLUCOSE UR STRIP-MCNC: NEGATIVE MG/DL
HCT VFR BLD AUTO: 41.6 % (ref 40–53)
HDLC SERPL-MCNC: 60 MG/DL
HGB BLD-MCNC: 14.5 G/DL (ref 13.3–17.7)
HGB UR QL STRIP: NEGATIVE
IMM GRANULOCYTES # BLD: 0 10E3/UL
IMM GRANULOCYTES NFR BLD: 0 %
KETONES UR STRIP-MCNC: NEGATIVE MG/DL
LDLC SERPL CALC-MCNC: 132 MG/DL
LEUKOCYTE ESTERASE UR QL STRIP: NEGATIVE
LYMPHOCYTES # BLD AUTO: 2.1 10E3/UL (ref 0.8–5.3)
LYMPHOCYTES NFR BLD AUTO: 48 %
MCH RBC QN AUTO: 32.2 PG (ref 26.5–33)
MCHC RBC AUTO-ENTMCNC: 34.9 G/DL (ref 31.5–36.5)
MCV RBC AUTO: 92 FL (ref 78–100)
MONOCYTES # BLD AUTO: 0.4 10E3/UL (ref 0–1.3)
MONOCYTES NFR BLD AUTO: 10 %
NEUTROPHILS # BLD AUTO: 1.8 10E3/UL (ref 1.6–8.3)
NEUTROPHILS NFR BLD AUTO: 40 %
NITRATE UR QL: NEGATIVE
NONHDLC SERPL-MCNC: 142 MG/DL
NRBC # BLD AUTO: 0 10E3/UL
NRBC BLD AUTO-RTO: 0 /100
PH UR STRIP: 7.5 [PH] (ref 5–7)
PLATELET # BLD AUTO: 200 10E3/UL (ref 150–450)
POTASSIUM SERPL-SCNC: 4.2 MMOL/L (ref 3.4–5.3)
PROT SERPL-MCNC: 7.3 G/DL (ref 6.4–8.3)
RBC # BLD AUTO: 4.5 10E6/UL (ref 4.4–5.9)
RBC URINE: <1 /HPF
SODIUM SERPL-SCNC: 139 MMOL/L (ref 136–145)
SP GR UR STRIP: 1.01 (ref 1–1.03)
TRIGL SERPL-MCNC: 51 MG/DL
UROBILINOGEN UR STRIP-MCNC: NORMAL MG/DL
WBC # BLD AUTO: 4.4 10E3/UL (ref 4–11)
WBC URINE: 0 /HPF

## 2023-05-03 PROCEDURE — 80053 COMPREHEN METABOLIC PANEL: CPT | Performed by: PATHOLOGY

## 2023-05-03 PROCEDURE — 81001 URINALYSIS AUTO W/SCOPE: CPT | Performed by: PATHOLOGY

## 2023-05-03 PROCEDURE — 36415 COLL VENOUS BLD VENIPUNCTURE: CPT | Performed by: PATHOLOGY

## 2023-05-03 PROCEDURE — 80061 LIPID PANEL: CPT | Performed by: PATHOLOGY

## 2023-05-03 PROCEDURE — 85025 COMPLETE CBC W/AUTO DIFF WBC: CPT | Performed by: PATHOLOGY

## 2023-05-04 DIAGNOSIS — Z51.81 MEDICATION MONITORING ENCOUNTER: Primary | ICD-10-CM

## 2023-05-04 NOTE — CONFIDENTIAL NOTE
Safety labs reviewed. Lipids are slightly off - will repeat fasting. Orders placed.  Nelly Madrid MD

## 2023-05-10 ENCOUNTER — TELEPHONE (OUTPATIENT)
Dept: DERMATOLOGY | Facility: CLINIC | Age: 28
End: 2023-05-10
Payer: COMMERCIAL

## 2023-05-10 NOTE — TELEPHONE ENCOUNTER
[facility] Call Center    Phone Message    May a detailed message be left on voicemail: yes     Reason for Call: Other: Patient sent in his hald of ignacia for [employer] patient application and he is running out of meds. What is status of application? When ignacia is done can refill be sent? Please call back [phone] Thank you     Action Taken: Message routed to:  [facility] Derm    Travel Screening: Not Applicable

## 2023-05-11 NOTE — TELEPHONE ENCOUNTER
Duplicate, see [employer] dated 5/04/23. His portion of [employer] ignacia was faxed on 5/9/23. Awaiting response from [employer]

## 2023-05-11 NOTE — TELEPHONE ENCOUNTER
Messaged Interview [employer] to check on status of application, will update when they respond back.

## 2023-05-25 NOTE — TELEPHONE ENCOUNTER
[employer] states they are missing a signature from the patient. Sent my chart message to patient to follow up.

## 2023-06-01 DIAGNOSIS — M35.9 AUTOIMMUNE DISEASE (H): ICD-10-CM

## 2023-06-01 DIAGNOSIS — L63.9 AA (ALOPECIA AREATA): Primary | ICD-10-CM

## 2023-06-01 NOTE — TELEPHONE ENCOUNTER
[facility] Call Center    Phone Message    May a detailed message be left on voicemail: yes     Reason for Call: Medication Refill Request    Has the patient contacted the pharmacy for the refill? Yes   Name of medication being requested: tofacitinib (XELJANZ) 5 MG tablet  Provider who prescribed the medication:   Pharmacy: [facility]  Date medication is needed: Now      Action Taken: Message routed to:  [facility] ([facility] Derm    Travel Screening: Not Applicable

## 2023-06-01 NOTE — TELEPHONE ENCOUNTER
tofacitinib (XELJANZ) 5 MG tablet  Last Written Prescription Date:  ?  Last Fill Quantity: ?,   # refills: ?  Last Office Visit :  5/2/2023  Future Office visit:   1/15/2024    Routing refill request to provider for review/approval because:  Reported as historical       Steph Mahoney RN  Central Triage Red Flags/Med Refills

## 2023-06-06 NOTE — TELEPHONE ENCOUNTER
[employer] says there no assistance for commercially insurance patient, this is new information. Waiting to hear back from supervisor to confirm this is true

## 2023-06-06 NOTE — TELEPHONE ENCOUNTER
PA Initiation    Medication: XELJANZ 5 MG PO TABS  Insurance Company: [employer] - Phone [phone] Fax [phone]  [facility] [place] [place] [id number]  Filling Pharmacy Phone:    Filling Pharmacy Fax:    Start Date: 6/6/2023    Key: [id number]

## 2023-06-08 NOTE — TELEPHONE ENCOUNTER
PA pending with insurance. [employer] also states that if the patient has commercial insurance and received assistance last year they can receive assistance this year

## 2023-06-12 ENCOUNTER — TELEPHONE (OUTPATIENT)
Dept: DERMATOLOGY | Facility: CLINIC | Age: 28
End: 2023-06-12
Payer: COMMERCIAL

## 2023-06-12 NOTE — TELEPHONE ENCOUNTER
"Called and verified patient by last name and . Patient wants to know status of free drug application for Xeljanz. Informed patient of latest message from Gilma, \"Gilma French     [place]    23  8:45 AM  Note  Patient insruance has approved xeljanz however there is a high copay. Patient is enrolled in copay card but it is still coming back at $1438.09. Waiting to hear back from [employer] on why this maybe happening          Informed patient I would reach out to Gilma to see if she has heard back yet. Patient requesting update from clinic once we hear back.    Solomon García, EMT    "

## 2023-06-12 NOTE — TELEPHONE ENCOUNTER
Patient felipe has approved xeljanz however there is a high copay. Patient is enrolled in copay card but it is still coming back at $1438.09. Waiting to hear back from [employer] on why this maybe happening

## 2023-06-12 NOTE — TELEPHONE ENCOUNTER
[facility] Call Center    Phone Message    May a detailed message be left on voicemail: yes     Reason for Call: Medication Question or concern regarding medication   Prescription Clarification  Name of Medication: tofacitinib (XELJANZ) 5 MG tablet  Prescribing Provider: Dr. Madrid   Pharmacy: NA   What on the order needs clarification? Pt has not heard back. Pt sent a message a week and a half ago regarding the Rx working on the program with XELJANZ program. Please call pt back to discuss. Thanks           Action Taken: Message routed to:  [facility] Derm    Travel Screening: Not Applicable

## 2023-06-12 NOTE — TELEPHONE ENCOUNTER
Prior Authorization Approval    Medication: XELJANZ 5 MG PO TABS  Authorization Effective Date:    Authorization Expiration Date:    Approved Dose/Quantity: 60 per 30 days  Reference #: Key: [id number]   Insurance Company: [employer] - Phone [phone] Fax [phone]  Expected CoPay: $1577     CoPay Card Available: Yes    Financial Assistance Needed: yes  Which Pharmacy is filling the prescription: [facility] - [place], [place] - [address]  Pharmacy Notified: No  Patient Notified: No

## 2023-06-13 NOTE — TELEPHONE ENCOUNTER
Gilma French  You 3 hours ago (9:10 AM)     [place]  Copay issues have been resolved and patient was informed.     Thank you   Gilma     See 6/13/2023 mychart encounter for more information. Closing this encounter.

## 2023-06-13 NOTE — TELEPHONE ENCOUNTER
Copay issues are now resolved. [employer] states that a virtual debit card was sent to patient. Sent my chart message to patient and prescription sent over to [facility].

## 2023-08-30 ENCOUNTER — OFFICE VISIT (OUTPATIENT)
Dept: URGENT CARE | Facility: URGENT CARE | Age: 28
End: 2023-08-30
Payer: COMMERCIAL

## 2023-08-30 VITALS
SYSTOLIC BLOOD PRESSURE: 130 MMHG | TEMPERATURE: 98 F | HEART RATE: 82 BPM | OXYGEN SATURATION: 98 % | DIASTOLIC BLOOD PRESSURE: 91 MMHG

## 2023-08-30 DIAGNOSIS — S01.111A RIGHT EYELID LACERATION, INITIAL ENCOUNTER: Primary | ICD-10-CM

## 2023-08-30 DIAGNOSIS — T14.8XXA SKIN AVULSION: ICD-10-CM

## 2023-08-30 PROCEDURE — 99203 OFFICE O/P NEW LOW 30 MIN: CPT | Performed by: PHYSICIAN ASSISTANT

## 2023-08-30 NOTE — PATIENT INSTRUCTIONS
Apply bacitracin to the large wound that is above your eyebrow  Do not place anything on the area which has Dermabond.  The glue will come off on its own in 5 to 7 days.  If you have increased pain, swelling or tenderness around your eye, please follow-up in the emergency department.

## 2023-08-30 NOTE — PROGRESS NOTES
Assessment & Plan   1. Right eyelid laceration, initial encounter    2. Skin avulsion      The patient presented with two lacerations.  The wound was carefully evaluated and explored.  No foreign bodies were noted on exploration.  The laceration was closed with dermabond as documented in the note. The skin avulsion is unable to be sutured.  There is no evidence of muscular, tendon, or bony damage with this laceration.  There is also no evidence of vascular or neurologic compromise.  Possible complications (infection, scarring) were reviewed with the patient. Keep clean and dry for the next 48 hours. Apply bacitracin to the area two times per day to the wound above the brow. The eyelid laceration glue will fall off within one week.        Queenie Soares PA-C  [facility]    CHIEF COMPLAINT:   Chief Complaint   Patient presents with    Urgent Care     Laceration on r eyebrow area- 2- 30 minutes ago- ran ointo a fence at a softball tournament- no concussion sx or vision concerns     Subjective     Dariusz [place] is a 28 year old male who presents to the clinic with a laceration above the right eyebrow.  Sustained about an hour and a half ago.  Patient was playing in a softball tournament, and ran into the fence.  He did not have any loss of consciousness no episodes of vomiting.  He has not had any vision changes, lightheadedness or dizziness.  He does not have a headache.  His last tetanus was in 2017.        No past medical history on file.  No past surgical history on file.  Social History     Tobacco Use    Smoking status: Never    Smokeless tobacco: Never   Substance Use Topics    Alcohol use: Not on file     Current Outpatient Medications   Medication    clindamycin (CLEOCIN T) 1 % lotion    DESCOVY 200-25 MG per tablet    Minoxidil (ROGAINE MENS) 5 % FOAM    Multiple Vitamins-Minerals (MULTIVITAMIN & MINERAL PO)    tofacitinib (XELJANZ) 5 MG tablet    tretinoin (RETIN-A) 0.025 %  external cream    triamcinolone (KENALOG) 0.1 % external cream    aluminum chloride (DRYSOL) 20 % external solution    Biotin 10 MG TABS tablet    clobetasol (TEMOVATE) 0.05 % external solution    clobetasol propionate 0.05 % SHAM    emtricitabine-tenofovir (TRUVADA) 200-300 MG per tablet    ketoconazole (NIZORAL) 2 % external shampoo    sulfacetamide sodium-sulfur 10-5 % LOTN    Sulfacetamide-Sulfur-Cleanser (ROSADERM) 10-5 % KIT    Tazarotene (TAZORAC) 0.05 % CREA     No current facility-administered medications for this visit.     No Known Allergies    10 point ROS of systems were all negative except for pertinent positives noted in my HPI.      Exam:  The patient appears today in alert,no apparent distress distress  VITALS: BP (!) 130/91   Pulse 82   Temp 98  F (36.7  C) (Tympanic)   SpO2 98%     Size of laceration: 1) 0.5 cm located on right lid 2) skin avulsion over right brow  Eye: EOMI  He does not have tenderness to palpation around globe Bruising noted.         Wound cleaned with HIBICLENS  Inspected for FB, deeper structure damage etc  Dermabond applied to laceration on lid

## 2023-09-16 NOTE — PROGRESS NOTES
SUBJECTIVE:   CC: Dariusz is an 28 year old who presents for preventative health visit.     Healthy Habits:     Getting at least 3 servings of Calcium per day:  NO    Bi-annual eye exam:  NO    Dental care twice a year:  Yes    Sleep apnea or symptoms of sleep apnea:  Daytime drowsiness and Excessive snoring    Diet:  Regular (no restrictions)    Frequency of exercise:  1 day/week    Duration of exercise:  Greater than 60 minutes    Taking medications regularly:  Yes    Medication side effects:  None    Additional concerns today:  No    - plays softball Sundays, soccer some Wednesdays  - job is active    Have you ever done Advance Care Planning? (For example, a Health Directive, POLST, or a discussion with a medical provider or your loved ones about your wishes): No, advance care planning information given to patient to review.  Patient plans to discuss their wishes with loved ones or provider.      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  Neck circumference: 37.5 cm   1. Snoring   Do you snore loudly (louder than talking or loud enough to be heard   through closed doors)? yes  2. Tired   Do you often feel tired, fatigued, or sleepy during daytime? yes  3. Observed   Has anyone  observed you stop breathing during your sleep? yes  4. Blood pressure   Do you have or are you being treated for high blood pressure? no  5. BMI   BMI more than 35 kg/m2? no  6. Age   Age over 50 yr old? no  7. Neck circumference   Neck circumference greater than 40 cm? no  8. Gender   Gender male? yes      # HIV Pre-exposure Prophylaxis  - Risk Factors for HIV infection: around 10 partners in the past 6 months, male partners, includes anal sex, condoms occasionally (with new partners), has had STIs in the past (2022 most recently).  - was on Truvada at first and then switched to Descovy when he switched to using [employer] service    - HBV testin/3/22 sAg/cAb nonreactive, no sAb but has 3 doses of vaccine in [facility]  - HCV testin/3/22 Ab nonreactive  - HAV immunization: 2 doses in [facility]  - HPV immunization: 3 doses in [facility]  - MPOX immunization: 2 doses in [facility]  Creatinine   Date Value Ref Range Status   2023 0.96 0.67 - 1.17 mg/dL Final   10/10/2022 0.95 0.67 - 1.17 mg/dL Final   2022 1.06 0.66 - 1.25 mg/dL Final   2007 0.62 0.40 - 1.00 mg/dL Final         Social History     Tobacco Use    Smoking status: Never    Smokeless tobacco: Never   Substance Use Topics    Alcohol use: Yes     Comment: 1 drink a week, maybe once a month will have 4 or more drinks in a sitting             2023     9:57 AM   Alcohol Use   Prescreen: >3 drinks/day or >7 drinks/week? No     Last PSA: No results found for: PSA    Reviewed orders with patient. Reviewed health maintenance and updated orders accordingly - Yes    Reviewed and updated as needed this visit by clinical staff   Tobacco  Allergies  Meds   Med Hx  Surg Hx  Fam Hx          Reviewed and updated as needed this visit by Provider   Tobacco  Allergies  Meds   Med Hx  Surg Hx  Fam Hx           Review of Systems   Constitutional:  Negative for chills and fever.   HENT:  Positive for congestion. Negative for ear pain, hearing loss and  "sore throat.    Eyes:  Negative for pain and visual disturbance.   Respiratory:  Positive for cough. Negative for shortness of breath.    Cardiovascular:  Negative for chest pain, palpitations and peripheral edema.   Gastrointestinal:  Negative for abdominal pain, constipation, diarrhea, heartburn, hematochezia and nausea.   Genitourinary:  Negative for dysuria, frequency, genital sores, hematuria, impotence, penile discharge and urgency.   Musculoskeletal:  Negative for arthralgias, joint swelling and myalgias.   Skin:  Positive for rash.   Neurological:  Negative for dizziness, weakness, headaches and paresthesias.   Psychiatric/Behavioral:  Negative for mood changes. The patient is nervous/anxious.        OBJECTIVE:   /83 (BP Location: Left arm, Patient Position: Sitting, Cuff Size: Adult Regular)   Pulse 67   Temp 98.2  F (36.8  C) (Skin)   Resp 15   Ht 1.753 m (5' 9\")   Wt 67.6 kg (149 lb)   SpO2 97%   BMI 22.00 kg/m      Physical Exam  GENERAL: healthy, alert and no distress  EYES: Eyes grossly normal to inspection, PERRL and conjunctivae and sclerae normal  HENT: ear canals and TM's normal, nose and mouth without ulcers or lesions  NECK: no adenopathy, no asymmetry, masses, or scars and thyroid normal to palpation  RESP: lungs clear to auscultation - no rales, rhonchi or wheezes  CV: regular rate and rhythm, normal S1 S2, no S3 or S4, no murmur, click or rub, no peripheral edema and peripheral pulses strong  ABDOMEN: soft, nontender, no hepatosplenomegaly, no masses and bowel sounds normal  MS: no gross musculoskeletal defects noted, no edema  SKIN: no suspicious lesions or rashes  NEURO: Normal strength and tone, mentation intact and speech normal  PSYCH: mentation appears normal, affect normal/bright    Diagnostic Test Results:  Labs reviewed in Epic    ASSESSMENT/PLAN:     (Z00.00) Annual physical exam  (primary encounter diagnosis)  Comment: Age appropriate screening and preventive services " provided.   Plan:       (L63.9) AA (alopecia areata)  (Z23) Need for vaccination against Streptococcus pneumoniae  Comment: Follows with derm.  Due to Xeljanz (tofactinib) for AA, Dariusz should receive vaccines per immunosuppressed schedule. Given prevnar today. Would also qualify for Shingrix vaccination but I've had issues with insurance coverage previously and I recommended Dariusz discuss with his insurance provider before scheduling for this.   Plan: PNEUMOCOCCAL 20 VALENT CONJUGATE (PREVNAR 20)          (R06.83) Snoring  Comment: High-risk by STOP-BANG (4/8 positive). Referring to sleep medicine to discuss testing for HIEU.   Plan: Adult Sleep Eval & Management Referral          (Z23) Need for prophylactic vaccination and inoculation against influenza  Comment: Plan: INFLUENZA VACCINE >6 MONTHS (AFLURIA/FLUZONE)          (B36.0) Tinea versicolor  Comment: Currently not present but by Dariusz description of the rash and the response to ketoconazole 1% shampoo, it is very likely tinea versicolor. Recommended using shampoo 1-2 times a week to keep tinea count down and reduce frequency of flare ups.   Plan:     (F41.9) Anxiety  Comment: Did not have time at this first visit together to explore this much. Gave options for next steps including [facility] for DA here at [facility], provided with list of LGBTQ-identified therapists, [employer]. Can return any time to discuss medication options for anxiety.   Plan:       COUNSELING:   Reviewed preventive health counseling, as reflected in patient instructions      He reports that he has never smoked. He has never used smokeless tobacco.            MD MIHIR Bruno [facility]

## 2023-09-19 ENCOUNTER — OFFICE VISIT (OUTPATIENT)
Dept: FAMILY MEDICINE | Facility: CLINIC | Age: 28
End: 2023-09-19
Payer: COMMERCIAL

## 2023-09-19 VITALS
DIASTOLIC BLOOD PRESSURE: 83 MMHG | WEIGHT: 149 LBS | BODY MASS INDEX: 22.07 KG/M2 | HEIGHT: 69 IN | TEMPERATURE: 98.2 F | OXYGEN SATURATION: 97 % | RESPIRATION RATE: 15 BRPM | HEART RATE: 67 BPM | SYSTOLIC BLOOD PRESSURE: 126 MMHG

## 2023-09-19 DIAGNOSIS — B36.0 TINEA VERSICOLOR: ICD-10-CM

## 2023-09-19 DIAGNOSIS — L63.9 AA (ALOPECIA AREATA): Chronic | ICD-10-CM

## 2023-09-19 DIAGNOSIS — R06.83 SNORING: ICD-10-CM

## 2023-09-19 DIAGNOSIS — Z23 NEED FOR VACCINATION AGAINST STREPTOCOCCUS PNEUMONIAE: ICD-10-CM

## 2023-09-19 DIAGNOSIS — F41.9 ANXIETY: ICD-10-CM

## 2023-09-19 DIAGNOSIS — Z00.00 ANNUAL PHYSICAL EXAM: Primary | ICD-10-CM

## 2023-09-19 DIAGNOSIS — Z23 NEED FOR PROPHYLACTIC VACCINATION AND INOCULATION AGAINST INFLUENZA: ICD-10-CM

## 2023-09-19 RX ORDER — FEXOFENADINE HCL 180 MG/1
180 TABLET ORAL DAILY
COMMUNITY

## 2023-09-19 RX ORDER — KETOCONAZOLE 20 MG/G
CREAM TOPICAL DAILY
COMMUNITY
End: 2024-06-14

## 2023-09-19 ASSESSMENT — ENCOUNTER SYMPTOMS
HEMATURIA: 0
JOINT SWELLING: 0
MYALGIAS: 0
FREQUENCY: 0
CHILLS: 0
DIZZINESS: 0
PARESTHESIAS: 0
FEVER: 0
COUGH: 1
HEMATOCHEZIA: 0
NERVOUS/ANXIOUS: 1
DYSURIA: 0
NAUSEA: 0
WEAKNESS: 0
SORE THROAT: 0
PALPITATIONS: 0
DIARRHEA: 0
HEARTBURN: 0
HEADACHES: 0
CONSTIPATION: 0
ARTHRALGIAS: 0
ABDOMINAL PAIN: 0
EYE PAIN: 0
SHORTNESS OF BREATH: 0

## 2023-09-19 NOTE — NURSING NOTE
"28 year old  Chief Complaint   Patient presents with    Physical     Establish Care and Physical       Blood pressure 126/83, pulse 67, temperature 98.2  F (36.8  C), temperature source Skin, resp. rate 15, height 1.753 m (5' 9\"), weight 67.6 kg (149 lb), SpO2 97 %. Body mass index is 22 kg/m .  Patient Active Problem List   Diagnosis    AA (alopecia areata)    Focal hyperhidrosis    Autoimmune disease (H)    Acne vulgaris    Vitamin D deficiency       Wt Readings from Last 2 Encounters:   09/19/23 67.6 kg (149 lb)   01/02/17 68.9 kg (152 lb)     BP Readings from Last 3 Encounters:   09/19/23 126/83   08/30/23 (!) 130/91   05/02/23 (!) 140/76         Current Outpatient Medications   Medication    clindamycin (CLEOCIN T) 1 % lotion    DESCOVY 200-25 MG per tablet    ketoconazole (NIZORAL) 2 % external cream    Minoxidil (ROGAINE MENS) 5 % FOAM    Multiple Vitamins-Minerals (MULTIVITAMIN & MINERAL PO)    tofacitinib (XELJANZ) 5 MG tablet    tretinoin (RETIN-A) 0.025 % external cream    triamcinolone (KENALOG) 0.1 % external cream    UNABLE TO FIND    sulfacetamide sodium-sulfur 10-5 % LOTN    Sulfacetamide-Sulfur-Cleanser (ROSADERM) 10-5 % KIT     No current facility-administered medications for this visit.       Social History     Tobacco Use    Smoking status: Never    Smokeless tobacco: Never   Vaping Use    Vaping Use: Never used       Health Maintenance Due   Topic Date Due    ADVANCE CARE PLANNING  Never done    YEARLY PREVENTIVE VISIT  11/11/2022    INFLUENZA VACCINE (1) 09/01/2023       No results found for: PAP      September 19, 2023 10:04 AM    "

## 2023-09-19 NOTE — PATIENT INSTRUCTIONS
1) Talk to your parents about family history  Heart disease (heart attacks, stents, bypass surgeries)  Strokes  Diabetes  Prostate Cancer  Colon Cancer  Anything else  Who had the diagnosis, and at what age were they diagnosed?  Message me        2) Talk with your insurance provider about getting the Shingrix vaccine for shingles due to immunosuppression. Emphasize that you're younger than 50.     If yes, schedule a nurse visit to get the first 2 doses (2-6 months apart)  As a reminder, there is 40% chance of flu-like symptoms for a couple of days after each dose.       3) [facility]     Website that has many LGBT therapy options:  [email or web address]/    [facility]   - [email or web address]   - [address], [place] [address]   - [phone] for more information    [facility]   - [email or web address]   - [address]. [place] [address]   - [facility], [address]. [address] [place] [address]   - [phone] for more information    [facility]  - [email or web address]  - [address] [place] [address]  - [phone] for more information    [facility]   - [email or web address]  - [facility], [address], [place], [place] [address] [place] [address], [address], [place] [address] - [phone]

## 2023-09-19 NOTE — NURSING NOTE
Prior to immunization administration, verified patients identity using patient s name and date of birth. Please see Immunization Activity for additional information.     Screening Questionnaire for Adult Immunization    Are you sick today?   No   Do you have allergies to medications, food, a vaccine component or latex?   No   Have you ever had a serious reaction after receiving a vaccination?   No   Do you have a long-term health problem with heart, lung, kidney, or metabolic disease (e.g., diabetes), asthma, a blood disorder, no spleen, complement component deficiency, a cochlear implant, or a spinal fluid leak?  Are you on long-term aspirin therapy?   No   Do you have cancer, leukemia, HIV/AIDS, or any other immune system problem?   No   Do you have a parent, brother, or sister with an immune system problem?   No   In the past 3 months, have you taken medications that affect  your immune system, such as prednisone, other steroids, or anticancer drugs; drugs for the treatment of rheumatoid arthritis, Crohn s disease, or psoriasis; or have you had radiation treatments?   No   Have you had a seizure, or a brain or other nervous system problem?   No   During the past year, have you received a transfusion of blood or blood    products, or been given immune (gamma) globulin or antiviral drug?   No   For women: Are you pregnant or is there a chance you could become       pregnant during the next month?   No   Have you received any vaccinations in the past 4 weeks?   No     Immunization questionnaire answers were all negative.      Patient instructed to remain in clinic for 15 minutes afterwards, and to report any adverse reactions.     Screening performed by Modesta Langley LPN on 9/19/2023 at 11:03 AM.

## 2023-09-21 PROBLEM — Z20.6 CONTACT WITH AND (SUSPECTED) EXPOSURE TO HUMAN IMMUNODEFICIENCY VIRUS (HIV): Chronic | Status: ACTIVE | Noted: 2023-09-21

## 2023-09-21 PROBLEM — L11.1 GROVER'S DISEASE: Chronic | Status: ACTIVE | Noted: 2023-09-21

## 2024-01-15 ENCOUNTER — TELEPHONE (OUTPATIENT)
Dept: ORTHOPEDICS | Facility: CLINIC | Age: 29
End: 2024-01-15

## 2024-01-15 NOTE — TELEPHONE ENCOUNTER
LVM for patient to call back with more information, we do not have any imaging. Will need xrays for triaging. If patient did not bring xrays home with him, patient will need to start in sports and get new imaging.  Ortho-please watch for call back.  Rocio Brown ATC

## 2024-01-15 NOTE — TELEPHONE ENCOUNTER
[facility] Call Center    Phone Message    May a detailed message be left on voicemail: yes     Reason for Call: Other: Pt fractured arm in [facility] on 1/13 imaging was done in [facility] has a right humerus fracture currently in a sling flying back from [facility] today would like to schedule an appointment    Action Taken: Other: [facility]    Travel Screening: Not Applicable

## 2024-01-16 ENCOUNTER — DOCUMENTATION ONLY (OUTPATIENT)
Dept: ORTHOPEDICS | Facility: CLINIC | Age: 29
End: 2024-01-16

## 2024-01-16 ENCOUNTER — OFFICE VISIT (OUTPATIENT)
Dept: ORTHOPEDICS | Facility: CLINIC | Age: 29
End: 2024-01-16
Payer: COMMERCIAL

## 2024-01-16 ENCOUNTER — ANCILLARY PROCEDURE (OUTPATIENT)
Dept: GENERAL RADIOLOGY | Facility: CLINIC | Age: 29
End: 2024-01-16
Attending: STUDENT IN AN ORGANIZED HEALTH CARE EDUCATION/TRAINING PROGRAM
Payer: COMMERCIAL

## 2024-01-16 DIAGNOSIS — M84.421A PATHOLOGICAL FRACTURE OF RIGHT HUMERUS, UNSPECIFIED PATHOLOGICAL CAUSE, INITIAL ENCOUNTER: ICD-10-CM

## 2024-01-16 DIAGNOSIS — S42.351A CLOSED DISPLACED COMMINUTED FRACTURE OF SHAFT OF RIGHT HUMERUS, INITIAL ENCOUNTER: Primary | ICD-10-CM

## 2024-01-16 DIAGNOSIS — M79.621 PAIN IN RIGHT UPPER ARM: ICD-10-CM

## 2024-01-16 PROCEDURE — 73030 X-RAY EXAM OF SHOULDER: CPT | Mod: TC | Performed by: STUDENT IN AN ORGANIZED HEALTH CARE EDUCATION/TRAINING PROGRAM

## 2024-01-16 PROCEDURE — 99204 OFFICE O/P NEW MOD 45 MIN: CPT | Performed by: STUDENT IN AN ORGANIZED HEALTH CARE EDUCATION/TRAINING PROGRAM

## 2024-01-16 PROCEDURE — 73060 X-RAY EXAM OF HUMERUS: CPT | Mod: TC | Performed by: STUDENT IN AN ORGANIZED HEALTH CARE EDUCATION/TRAINING PROGRAM

## 2024-01-16 NOTE — LETTER
1/16/2024         RE: Dariusz [place]  [address]  [place] [address]        Dear Colleague,    Thank you for referring your patient, Dariusz [place], to the [facility]. Please see a copy of my visit note below.    ASSESSMENT & PLAN    Dariusz was seen today for pain and pain.    Diagnoses and all orders for this visit:    Closed displaced comminuted fracture of shaft of right humerus, initial encounter  -     Orthopedic  Referral; Future  -     Wrist/Arm/Hand Bracking Supplies Order Other (comments) (Alexander brace)  -     MR Humerus Upper Arm Right wo & w Contrast    Pain in right upper arm  -     Cancel: XR Shoulder Right G/E 3 Views; Future  -     XR Humerus RT G/E 2 vw; Future    Pathological fracture of right humerus, unspecified pathological cause, initial encounter  -     MR Humerus Upper Arm Right wo & w Contrast      28-year-old male presents with right arm pain for the last 4 days after throwing a softball. X-ray today reveals a comminuted humeral shaft fracture, and physical exam is significant swelling and ecchymosis and radial nerve palsy on exam with complete wrist drop and decreased sensation in the superficial radial nerve distribution.  Given mechanism of injury, I am concerned for pathological fracture at this time.    Plan:  -STAT MRI of R humerus with and without contrast to evaluate for underlying osseous abnormalities   -Refer to ortho oncology to discuss surgical management  -Order for Alexander brace placed to wear until ortho eval  -Pain control: Tylenol Extra Strength (1000mg) up to three times daily as needed for pain, can substitute Percocet for one of the Tylenol doses if needed. Ok to alternate with Ibuprofen.   -Can ice as needed for swelling   -Will touch base after MRI   -Start Vitamin D at least 1000 international unit(s) daily, patient has a history of vitamin D deficiency      Return if symptoms worsen or fail to  improve.      Dr. Marisabel Tejeda, DO  [facility]  Sports Medicine     -----  Chief Complaint   Patient presents with     Right Shoulder - Pain     Right Elbow - Pain       SUBJECTIVE  Dariusz [place] is a/an 28 year old male who is seen as an ER referral for evaluation of right humerus fracture.   The patient is seen alone    Onset: 4 day(s) ago. Injury occurred after throwing a softball hard form the outfield. Immediate  sharp pain and felt it out of place and heard a pop.   Location/characterization of Pain: right lateral upper arm, with some numbness into the dorsal hand  Current method of immobilization?: splint, sling  Associated symptoms: Swelling and bruising of the arm and hand, numbness of the dorsal thumb, sharp pain of the upper arm.   Weakness of wrist extensors and EPL.     Prior injury/Surgical history of affected joint: NO  Social History/Occupation: Nurse in the recovery room.     REVIEW OF SYSTEMS:  Pertinent positives/negative: As stated above in HPI    OBJECTIVE:  There were no vitals taken for this visit.   General: Alert and in no distress  Skin: no visable rashes  CV: Extremities appear well perfused   Resp: normal respiratory effort, no conversational dyspnea   Psych: normal mood, affect  MSK:  R arm exam  significant swelling and ecchymosis of the right arm and hand  Radial nerve palsy with diminished sensation in the superficial radial sensory nerve distribution and wrist drop, unable to perform active wrist extension, weakness of EPL   Radial and ulnar pulses intact  Capillary refill less than 2 seconds  Intact sensation in axillary nerve distribution    RADIOLOGY:  Final results and radiologist's interpretation available in the [facility] health record.  Images were personally reviewed and discussed with the patient in the office today.  My personal interpretation of the performed imaging: X-ray of the right humerus today reveals a midshaft, comminuted humerus  fracture.                      Again, thank you for allowing me to participate in the care of your patient.        Sincerely,        Marisabel Tejeda, DO

## 2024-01-16 NOTE — TELEPHONE ENCOUNTER
DIAGNOSIS: Concern for pathologic fracture.  Closed displaced comminuted fracture of shaft of right humerus, initial encounter [S42.351A]  - Primary   APPOINTMENT DATE:  01/17/2024   NOTES STATUS DETAILS   OFFICE NOTE from referring provider Internal 01/16/2024 - Marisabel Tejeda MD - [facility]   IMAGING Internal XRAY AND MRI

## 2024-01-16 NOTE — PROGRESS NOTES
ASSESSMENT & PLAN    Dariusz was seen today for pain and pain.    Diagnoses and all orders for this visit:    Closed displaced comminuted fracture of shaft of right humerus, initial encounter  -     Orthopedic  Referral; Future  -     Wrist/Arm/Hand Bracking Supplies Order Other (comments) (Alexander brace)  -     MR Humerus Upper Arm Right wo & w Contrast    Pain in right upper arm  -     Cancel: XR Shoulder Right G/E 3 Views; Future  -     XR Humerus RT G/E 2 vw; Future    Pathological fracture of right humerus, unspecified pathological cause, initial encounter  -     MR Humerus Upper Arm Right wo & w Contrast      28-year-old male presents with right arm pain for the last 4 days after throwing a softball. X-ray today reveals a comminuted humeral shaft fracture, and physical exam is significant swelling and ecchymosis and radial nerve palsy on exam with complete wrist drop and decreased sensation in the superficial radial nerve distribution.  Given mechanism of injury, I am concerned for pathological fracture at this time.    Plan:  -STAT MRI of R humerus with and without contrast to evaluate for underlying osseous abnormalities   -Refer to ortho oncology to discuss surgical management  -Order for Alexander brace placed to wear until ortho eval  -Pain control: Tylenol Extra Strength (1000mg) up to three times daily as needed for pain, can substitute Percocet for one of the Tylenol doses if needed. Ok to alternate with Ibuprofen.   -Can ice as needed for swelling   -Will touch base after MRI   -Start Vitamin D at least 1000 international unit(s) daily, patient has a history of vitamin D deficiency      Return if symptoms worsen or fail to improve.      Dr. Marisabel Tejeda, DO  [facility]  Sports Medicine     -----  Chief Complaint   Patient presents with    Right Shoulder - Pain    Right Elbow - Pain       SUBJECTIVE  Dariusz Marshall is a/an 28 year old male who is seen as an ER  referral for evaluation of right humerus fracture.   The patient is seen alone    Onset: 4 day(s) ago. Injury occurred after throwing a softball hard form the outfield. Immediate  sharp pain and felt it out of place and heard a pop.   Location/characterization of Pain: right lateral upper arm, with some numbness into the dorsal hand  Current method of immobilization?: splint, sling  Associated symptoms: Swelling and bruising of the arm and hand, numbness of the dorsal thumb, sharp pain of the upper arm.   Weakness of wrist extensors and EPL.     Prior injury/Surgical history of affected joint: NO  Social History/Occupation: Nurse in the recovery room.     REVIEW OF SYSTEMS:  Pertinent positives/negative: As stated above in HPI    OBJECTIVE:  There were no vitals taken for this visit.   General: Alert and in no distress  Skin: no visable rashes  CV: Extremities appear well perfused   Resp: normal respiratory effort, no conversational dyspnea   Psych: normal mood, affect  MSK:  R arm exam  significant swelling and ecchymosis of the right arm and hand  Radial nerve palsy with diminished sensation in the superficial radial sensory nerve distribution and wrist drop, unable to perform active wrist extension, weakness of EPL   Radial and ulnar pulses intact  Capillary refill less than 2 seconds  Intact sensation in axillary nerve distribution    RADIOLOGY:  Final results and radiologist's interpretation available in the [facility] health record.  Images were personally reviewed and discussed with the patient in the office today.  My personal interpretation of the performed imaging: X-ray of the right humerus today reveals a midshaft, comminuted humerus fracture.

## 2024-01-16 NOTE — PROGRESS NOTES
"S: Pt is a 28 yom seen in the [facility] for evaluation, fitting, and delivery of a right Alexander Humeral Fracture Orthosis. Pt states that he broke his right arm 4 days ago throwing a softball.    Dx: Closed displaced comminuted fracture of shaft of right humerus [S42.351A]    O: 5' 9\", 150 lbs. Pt arrived in clinic wearing a splint wrapped in an ace bandage and a sling. Right arm was bruised and swollen.    A: Pt s current splint was removed and stockinette interface was donned on his right arm. He was fit with a size large Humeral Fracture Orthosis on his right side. The brace provided underarm and epicondyle clearance at the elbow. Straps were tightened appropriately. Pt was satisfied with fit and function. Donning/doffing and wear/care were discussed with the pt.    P: The orthosis should be worn according to the physician s directions. All questions answered at this time. F/u prn.    G: Provide hydrostatic compression to humerus to allow micromovements in the bone that will promote healing of fx.    Electronically signed by Shagufta Welch CPO, WILLAO  "

## 2024-01-16 NOTE — PATIENT INSTRUCTIONS
[address], [address] 1/16/24 at 2:00pm    Thank you for choosing [facility] Sports [facility]!    DR. DESHPANDE'S CLINIC LOCATIONS:     [place]  TRIAGE LINE: [phone] [address] APPOINTMENTS: [phone]   [place] [address] RADIOLOGY: [phone]   (Mondays & Tuesdays) HAND THERAPY: [phone]    PHYSICAL THERAPY: [phone]   [place] BILLING QUESTIONS: [phone] [address] FAX: [phone]   [place] [address]    (Thursdays & Fridays)       Plan:  -STAT MRI of R humerus   -Refer to ortho to discuss surgical management  -Order for Alexander brace placed to wear until ortho eval  -Pain control: Tylenol Extra Strength (1000mg) up to three times daily as needed for pain, can substitute Percocet for one of the Tylenol doses if needed. Ok to alternate with Ibuprofen.   -Can ice as needed for swelling   -Will touch base after MRI   -Start Vitamin D at least 1000 international unit(s) daily

## 2024-01-17 ENCOUNTER — OFFICE VISIT (OUTPATIENT)
Dept: ORTHOPEDICS | Facility: CLINIC | Age: 29
End: 2024-01-17
Payer: COMMERCIAL

## 2024-01-17 ENCOUNTER — HOSPITAL ENCOUNTER (OUTPATIENT)
Dept: MRI IMAGING | Facility: CLINIC | Age: 29
Discharge: HOME | End: 2024-01-17
Attending: STUDENT IN AN ORGANIZED HEALTH CARE EDUCATION/TRAINING PROGRAM | Admitting: STUDENT IN AN ORGANIZED HEALTH CARE EDUCATION/TRAINING PROGRAM
Payer: COMMERCIAL

## 2024-01-17 ENCOUNTER — PRE VISIT (OUTPATIENT)
Dept: ORTHOPEDICS | Facility: CLINIC | Age: 29
End: 2024-01-17

## 2024-01-17 VITALS — WEIGHT: 151 LBS | HEIGHT: 69 IN | BODY MASS INDEX: 22.36 KG/M2

## 2024-01-17 DIAGNOSIS — S42.351A CLOSED DISPLACED COMMINUTED FRACTURE OF SHAFT OF RIGHT HUMERUS, INITIAL ENCOUNTER: ICD-10-CM

## 2024-01-17 DIAGNOSIS — G58.9 NERVE PALSY: Primary | ICD-10-CM

## 2024-01-17 PROCEDURE — 73220 MRI UPPR EXTREMITY W/O&W/DYE: CPT | Mod: RT

## 2024-01-17 PROCEDURE — 99203 OFFICE O/P NEW LOW 30 MIN: CPT | Mod: GC | Performed by: ORTHOPAEDIC SURGERY

## 2024-01-17 PROCEDURE — A9585 GADOBUTROL INJECTION: HCPCS | Performed by: STUDENT IN AN ORGANIZED HEALTH CARE EDUCATION/TRAINING PROGRAM

## 2024-01-17 PROCEDURE — 255N000002 HC RX 255 OP 636: Performed by: STUDENT IN AN ORGANIZED HEALTH CARE EDUCATION/TRAINING PROGRAM

## 2024-01-17 RX ORDER — GADOBUTROL 604.72 MG/ML
7 INJECTION INTRAVENOUS ONCE
Status: COMPLETED | OUTPATIENT
Start: 2024-01-17 | End: 2024-01-17

## 2024-01-17 RX ADMIN — GADOBUTROL 7 ML: 604.72 INJECTION INTRAVENOUS at 07:25

## 2024-01-17 NOTE — LETTER
1/17/2024         RE: Dariusz Marshall  [address]  [place] [address]        Dear Colleague,    Thank you for referring your patient, Dariusz Marshall, to the [facility] [place]. Please see a copy of my visit note below.    28-year-old right-hand-dominant male who presents today for evaluation of a right humerus fracture.  The patient states that last Saturday he was throwing a softball when he felt a crack in his arm and had immediate pain.  He was seen in clinic where x-rays demonstrated a comminuted displaced fracture of the midshaft humerus.  The patient also endorses numbness along the dorsum of his forearm and the dorsum of his wrist with difficulty extending his wrist, fingers and thumb.  The patient denies any history of fractures.    On exam today the patient is alert and oriented no acute distress.  He is resting comfortably.  He has a well-fitting Alexandre brace in place.  There is moderate swelling of the right hand.  Sensation diminished in the radial nerve distribution.  Sensation intact in the median/ulnar nerve distributions.  0 out of 5 strength of wrist extensors, finger extensors and EPL.  Able to make a fist, fire FPL and IO.    Review of imaging including x-ray right humerus demonstrates an acute, comminuted, spiral midshaft humerus fracture with large butterfly fragment.  MRI of the right humerus reviewed which we does not demonstrate the fracture.  There is no underlying bony lesion or cortical defect.    28-year-old right-hand-dominant male with an acute, comminuted, spiral midshaft humerus fracture with radial nerve palsy.  We discussed treatment options including both conservative and surgical management.  We discussed the risk and benefits of each of these options.  We discussed that we would expect his radial nerve function to improve over time with either treatment modality.  The patient would like to continue with conservative management for now.   Will plan for repeat x-rays in 2 weeks.  In the meantime the patient will continue to wear the Alexander brace.  We discussed the importance of working on elbow range of motion to prevent stiffness.  We will give the patient a cock-up wrist splint today as well.  All questions answered.    Patient seen and examined with Dr. Beckwith who agrees with the above assessment and plan.     Ian Augustine MD PGY4  Orthopedic Surgery       I was present with the resident during the history and exam.  I discussed the case with the resident and agree with the findings as documented in the assessment and plan.    Chaka Beckwith MD

## 2024-01-17 NOTE — NURSING NOTE
"Reason For Visit:   Chief Complaint   Patient presents with    Consult     Right humerus fracture, concerning for pathologic fracture referred by Dr. Marisabel Tejeda // fractured while through soft ball        Ht 1.76 m (5' 9.29\")   Wt 68.5 kg (151 lb)   BMI 22.11 kg/m      Pain Assessment  Patient Currently in Pain: Yes  0-10 Pain Scale: 1 (can be painful with wrong movements)  Primary Pain Location:  (right humerus)      Jose Roberto Kelley ATC    "

## 2024-01-17 NOTE — PROGRESS NOTES
28-year-old right-hand-dominant male who presents today for evaluation of a right humerus fracture.  The patient states that last Saturday he was throwing a softball when he felt a crack in his arm and had immediate pain.  He was seen in clinic where x-rays demonstrated a comminuted displaced fracture of the midshaft humerus.  The patient also endorses numbness along the dorsum of his forearm and the dorsum of his wrist with difficulty extending his wrist, fingers and thumb.  The patient denies any history of fractures.    On exam today the patient is alert and oriented no acute distress.  He is resting comfortably.  He has a well-fitting Alexander brace in place.  There is moderate swelling of the right hand.  Sensation diminished in the radial nerve distribution.  Sensation intact in the median/ulnar nerve distributions.  0 out of 5 strength of wrist extensors, finger extensors and EPL.  Able to make a fist, fire FPL and IO.    Review of imaging including x-ray right humerus demonstrates an acute, comminuted, spiral midshaft humerus fracture with large butterfly fragment.  MRI of the right humerus reviewed which we does not demonstrate the fracture.  There is no underlying bony lesion or cortical defect.    28-year-old right-hand-dominant male with an acute, comminuted, spiral midshaft humerus fracture with radial nerve palsy.  We discussed treatment options including both conservative and surgical management.  We discussed the risk and benefits of each of these options.  We discussed that we would expect his radial nerve function to improve over time with either treatment modality.  The patient would like to continue with conservative management for now.  Will plan for repeat x-rays in 2 weeks.  In the meantime the patient will continue to wear the Alexander brace.  We discussed the importance of working on elbow range of motion to prevent stiffness.  We will give the patient a cock-up wrist splint today as well.   All questions answered.    Patient seen and examined with Dr. Beckwith who agrees with the above assessment and plan.     Ian Augustine MD PGY4  Orthopedic Surgery

## 2024-01-19 ENCOUNTER — TELEPHONE (OUTPATIENT)
Dept: OCCUPATIONAL THERAPY | Facility: CLINIC | Age: 29
End: 2024-01-19

## 2024-01-19 NOTE — TELEPHONE ENCOUNTER
M at provided number regarding missed evaluation this morning. Reiterated the purpose of therapy, importance of evaluation and ongoing management given the extent of injury. Contact information provided for rescheduling.    Per Dr Lynette Hassan  ok to restart potassium again   Pt informed of hemoglobin results.

## 2024-01-24 DIAGNOSIS — S42.351A CLOSED DISPLACED COMMINUTED FRACTURE OF SHAFT OF RIGHT HUMERUS, INITIAL ENCOUNTER: Primary | ICD-10-CM

## 2024-01-24 RX ORDER — OXYCODONE HYDROCHLORIDE 5 MG/1
5 TABLET ORAL EVERY 6 HOURS PRN
Qty: 20 TABLET | Refills: 0 | Status: SHIPPED | OUTPATIENT
Start: 2024-01-24 | End: 2024-01-29

## 2024-01-25 DIAGNOSIS — S42.351A CLOSED DISPLACED COMMINUTED FRACTURE OF SHAFT OF RIGHT HUMERUS, INITIAL ENCOUNTER: Primary | ICD-10-CM

## 2024-01-31 ENCOUNTER — ANCILLARY PROCEDURE (OUTPATIENT)
Dept: GENERAL RADIOLOGY | Facility: CLINIC | Age: 29
End: 2024-01-31
Attending: ORTHOPAEDIC SURGERY
Payer: COMMERCIAL

## 2024-01-31 ENCOUNTER — OFFICE VISIT (OUTPATIENT)
Dept: ORTHOPEDICS | Facility: CLINIC | Age: 29
End: 2024-01-31
Payer: COMMERCIAL

## 2024-01-31 DIAGNOSIS — S42.351A CLOSED DISPLACED COMMINUTED FRACTURE OF SHAFT OF RIGHT HUMERUS, INITIAL ENCOUNTER: ICD-10-CM

## 2024-01-31 DIAGNOSIS — S42.351A CLOSED DISPLACED COMMINUTED FRACTURE OF SHAFT OF RIGHT HUMERUS, INITIAL ENCOUNTER: Primary | ICD-10-CM

## 2024-01-31 PROCEDURE — 99213 OFFICE O/P EST LOW 20 MIN: CPT | Performed by: ORTHOPAEDIC SURGERY

## 2024-01-31 PROCEDURE — 73060 X-RAY EXAM OF HUMERUS: CPT | Mod: RT | Performed by: RADIOLOGY

## 2024-01-31 RX ORDER — OXYCODONE HYDROCHLORIDE 5 MG/1
TABLET ORAL
COMMUNITY
Start: 2024-01-24

## 2024-01-31 RX ORDER — OXYCODONE AND ACETAMINOPHEN 5; 325 MG/1; MG/1
TABLET ORAL
COMMUNITY
Start: 2024-01-14

## 2024-01-31 NOTE — NURSING NOTE
Reason For Visit:   Chief Complaint   Patient presents with    RECHECK     Follow up right humerus fracture DOI 1/12/24 // pain has gotten a little better per pt        28 year old  1995      There were no vitals taken for this visit.    Pain Assessment  Patient Currently in Pain: Yes  0-10 Pain Scale: 1  Primary Pain Location:  (right humerus)      Jose Roberto Kelley, ATC

## 2024-01-31 NOTE — PROGRESS NOTES
This patient is noticing less pain when he has his brace off during showering and self-care.  He has not noticed any return of his nerve function and is bothered by his inability to move the thumb.  He does have a wrist brace that he wears.  He has been doing some elbow wrist and hand exercises.    Reviewed his x-rays which show some callus at the upper end of the fracture site and essentially unchanged position from x-rays taken 15 days ago.    I would like him to continue with the clamshell brace.  I will see him back in 2 weeks with repeat x-rays.  I have answered all of his questions today.

## 2024-01-31 NOTE — LETTER
1/31/2024         RE: Dariusz Marshall  [address]  [place] [address]        Dear Colleague,    Thank you for referring your patient, Dariusz Marshall, to the [facility] [place]. Please see a copy of my visit note below.    This patient is noticing less pain when he has his brace off during showering and self-care.  He has not noticed any return of his nerve function and is bothered by his inability to move the thumb.  He does have a wrist brace that he wears.  He has been doing some elbow wrist and hand exercises.    Reviewed his x-rays which show some callus at the upper end of the fracture site and essentially unchanged position from x-rays taken 15 days ago.    I would like him to continue with the clamshell brace.  I will see him back in 2 weeks with repeat x-rays.  I have answered all of his questions today.    Sincerely,        Chaka Beckwith MD

## 2024-02-05 DIAGNOSIS — S42.351A CLOSED DISPLACED COMMINUTED FRACTURE OF SHAFT OF RIGHT HUMERUS, INITIAL ENCOUNTER: Primary | ICD-10-CM

## 2024-02-09 ENCOUNTER — DOCUMENTATION ONLY (OUTPATIENT)
Dept: ORTHOPEDICS | Facility: CLINIC | Age: 29
End: 2024-02-09
Payer: COMMERCIAL

## 2024-02-09 NOTE — PROGRESS NOTES
Received Completed forms Yes   Faxed Forms Faxed To: [place]  Fax Number: [phone]   Sent to [facility] (Date) 2/8/24

## 2024-02-21 ENCOUNTER — LAB (OUTPATIENT)
Dept: LAB | Facility: CLINIC | Age: 29
End: 2024-02-21
Payer: COMMERCIAL

## 2024-02-21 ENCOUNTER — ANCILLARY PROCEDURE (OUTPATIENT)
Dept: GENERAL RADIOLOGY | Facility: CLINIC | Age: 29
End: 2024-02-21
Attending: ORTHOPAEDIC SURGERY
Payer: COMMERCIAL

## 2024-02-21 ENCOUNTER — OFFICE VISIT (OUTPATIENT)
Dept: ORTHOPEDICS | Facility: CLINIC | Age: 29
End: 2024-02-21
Payer: COMMERCIAL

## 2024-02-21 DIAGNOSIS — S42.351A CLOSED DISPLACED COMMINUTED FRACTURE OF SHAFT OF RIGHT HUMERUS, INITIAL ENCOUNTER: Primary | ICD-10-CM

## 2024-02-21 DIAGNOSIS — S42.351A CLOSED DISPLACED COMMINUTED FRACTURE OF SHAFT OF RIGHT HUMERUS, INITIAL ENCOUNTER: ICD-10-CM

## 2024-02-21 DIAGNOSIS — Z51.81 MEDICATION MONITORING ENCOUNTER: ICD-10-CM

## 2024-02-21 DIAGNOSIS — G58.9 NERVE PALSY: ICD-10-CM

## 2024-02-21 LAB
ALBUMIN SERPL BCG-MCNC: 4.6 G/DL (ref 3.5–5.2)
ALP SERPL-CCNC: 97 U/L (ref 40–150)
ALT SERPL W P-5'-P-CCNC: 15 U/L (ref 0–70)
ANION GAP SERPL CALCULATED.3IONS-SCNC: 8 MMOL/L (ref 7–15)
AST SERPL W P-5'-P-CCNC: 20 U/L (ref 0–45)
BILIRUB SERPL-MCNC: 0.6 MG/DL
BUN SERPL-MCNC: 11.4 MG/DL (ref 6–20)
CALCIUM SERPL-MCNC: 9.4 MG/DL (ref 8.6–10)
CHLORIDE SERPL-SCNC: 103 MMOL/L (ref 98–107)
CREAT SERPL-MCNC: 0.98 MG/DL (ref 0.67–1.17)
DEPRECATED HCO3 PLAS-SCNC: 29 MMOL/L (ref 22–29)
EGFRCR SERPLBLD CKD-EPI 2021: >90 ML/MIN/1.73M2
GLUCOSE SERPL-MCNC: 90 MG/DL (ref 70–99)
POTASSIUM SERPL-SCNC: 3.8 MMOL/L (ref 3.4–5.3)
PROT SERPL-MCNC: 7.5 G/DL (ref 6.4–8.3)
SODIUM SERPL-SCNC: 140 MMOL/L (ref 135–145)
VIT D+METAB SERPL-MCNC: 27 NG/ML (ref 20–50)

## 2024-02-21 PROCEDURE — 36415 COLL VENOUS BLD VENIPUNCTURE: CPT | Performed by: PATHOLOGY

## 2024-02-21 PROCEDURE — 73060 X-RAY EXAM OF HUMERUS: CPT | Mod: RT | Performed by: RADIOLOGY

## 2024-02-21 PROCEDURE — 82306 VITAMIN D 25 HYDROXY: CPT | Performed by: ORTHOPAEDIC SURGERY

## 2024-02-21 PROCEDURE — 99000 SPECIMEN HANDLING OFFICE-LAB: CPT | Performed by: PATHOLOGY

## 2024-02-21 PROCEDURE — 99024 POSTOP FOLLOW-UP VISIT: CPT | Performed by: ORTHOPAEDIC SURGERY

## 2024-02-21 PROCEDURE — 80053 COMPREHEN METABOLIC PANEL: CPT | Performed by: PATHOLOGY

## 2024-02-21 NOTE — LETTER
[facility] CLIN[facility] [address]  4[address] FLOOR  [facility] [address]  [phone]          February 21, 2024    RE:  Dariusz Marshall                                                                                                                                                       [address]   [place] [address]            To whom it may concern:    Dariusz Marshall is under my professional care for his right humerus fracture. He may return to work for light duty. He may not use his right upper arm until further notice. Please contact my clinic with questions or concerns.          Sincerely,    Chaka Beckwith MD

## 2024-02-21 NOTE — LETTER
2/21/2024         RE: Dariusz Marshall  [address]  [place] [address]        Dear Colleague,    Thank you for referring your patient, Dariusz Marshall, to the [facility] [place]. Please see a copy of my visit note below.    Orthopedic Clinic Note     DOI: 1/13/2024  Mechanism: throwing a baseball     CC: follow up right humerus fracture    28-year-old right-hand-dominant individual who presents today for evaluation of a right humerus fracture with radial nerve palsy.  The patient was last seen 1/17/24, at which time the patient elected to proceed with nonoperative management in the Alexander brace. Since last visit the patient has been doing well, been wearing the brace about 90% of the time. The patient feels pain is well controlled and the arm has become more stable. Reports the radial nerve palsy is still present with no return of finger or wrist extension. Currently the patient does not have a wrist splint but is interested in on.     On exam today the patient is alert and oriented no acute distress.  He is resting comfortably.  He has a well-fitting Alexander brace in place. Sensation intact in the median/ulnar/radial nerve distributions.  0 out of 5 strength of wrist extensors, finger extensors and EPL.  Able to make a fist, fire FPL and IO. Digits supple, able to passively extend wrist and digits.     Review of imaging including x-ray right humerus demonstrates interval healing of right humeral shaft fracture with callus formation, no increase in angulation on the AP or lateral.     28-year-old right-hand-dominant male right spiral midshaft humerus fracture with radial nerve palsy sustained on 1/13/24.  We discussed the importance of a wrist splint, he would like to see hand therapy for a custom splint if able. Additionally discussed the importance of coming out of the sling to work on shoulder elbow, wrist, and finger ROM. The patient will continue to wear the  Catherine jara.     - Will plan to check Vit D today.   - Start 2000 international unit(s) Vit D   - Hand therapy for exercises and splint for radial nerve palsy   - Work note completed  - Continue NWB RUE   - Follow up 4 weeks with repeat XR at that time     Patient seen and examined with Dr. Beckwith who agrees with the above assessment and plan.     Venkat Gonzalez MD         I was present with the resident during the history and exam.  I discussed the case with the resident and agree with the findings as documented in the assessment and plan.      Chaka Beckwith MD

## 2024-02-21 NOTE — PROGRESS NOTES
Orthopedic Clinic Note     DOI: 1/13/2024  Mechanism: throwing a baseball     CC: follow up right humerus fracture    28-year-old right-hand-dominant individual who presents today for evaluation of a right humerus fracture with radial nerve palsy.  The patient was last seen 1/17/24, at which time the patient elected to proceed with nonoperative management in the Alexander brace. Since last visit the patient has been doing well, been wearing the brace about 90% of the time. The patient feels pain is well controlled and the arm has become more stable. Reports the radial nerve palsy is still present with no return of finger or wrist extension. Currently the patient does not have a wrist splint but is interested in on.     On exam today the patient is alert and oriented no acute distress.  He is resting comfortably.  He has a well-fitting Alexander brace in place. Sensation intact in the median/ulnar/radial nerve distributions.  0 out of 5 strength of wrist extensors, finger extensors and EPL.  Able to make a fist, fire FPL and IO. Digits supple, able to passively extend wrist and digits.     Review of imaging including x-ray right humerus demonstrates interval healing of right humeral shaft fracture with callus formation, no increase in angulation on the AP or lateral.     28-year-old right-hand-dominant male right spiral midshaft humerus fracture with radial nerve palsy sustained on 1/13/24.  We discussed the importance of a wrist splint, he would like to see hand therapy for a custom splint if able. Additionally discussed the importance of coming out of the sling to work on shoulder elbow, wrist, and finger ROM. The patient will continue to wear the Alexander brace.     - Will plan to check Vit D today.   - Start 2000 international unit(s) Vit D   - Hand therapy for exercises and splint for radial nerve palsy   - Work note completed  - Continue NWB RUE   - Follow up 4 weeks with repeat XR at that time     Patient  seen and examined with Dr. Beckwith who agrees with the above assessment and plan.     Venkat Gonzalez MD

## 2024-02-21 NOTE — NURSING NOTE
Reason For Visit:   Chief Complaint   Patient presents with    RECHECK     Follow up right humerus fracture DOI 1/12/24       28 year old  1995    Primary MD: Nestor Palumbo      There were no vitals taken for this visit.    Pain Assessment  Patient Currently in Pain: Yes  0-10 Pain Scale: 1  Primary Pain Location:  (right humerus)      Jose Roberto Kelley ATC

## 2024-02-22 ENCOUNTER — TELEPHONE (OUTPATIENT)
Dept: ORTHOPEDICS | Facility: CLINIC | Age: 29
End: 2024-02-22

## 2024-02-22 ENCOUNTER — THERAPY VISIT (OUTPATIENT)
Dept: OCCUPATIONAL THERAPY | Facility: CLINIC | Age: 29
End: 2024-02-22
Payer: COMMERCIAL

## 2024-02-22 DIAGNOSIS — S42.351A CLOSED DISPLACED COMMINUTED FRACTURE OF SHAFT OF RIGHT HUMERUS, INITIAL ENCOUNTER: ICD-10-CM

## 2024-02-22 DIAGNOSIS — G58.9 NERVE PALSY: ICD-10-CM

## 2024-02-22 PROCEDURE — 97165 OT EVAL LOW COMPLEX 30 MIN: CPT | Mod: GO | Performed by: OCCUPATIONAL THERAPIST

## 2024-02-22 PROCEDURE — 97760 ORTHOTIC MGMT&TRAING 1ST ENC: CPT | Mod: GO | Performed by: OCCUPATIONAL THERAPIST

## 2024-02-22 PROCEDURE — 97110 THERAPEUTIC EXERCISES: CPT | Mod: GO | Performed by: OCCUPATIONAL THERAPIST

## 2024-02-22 NOTE — PROGRESS NOTES
OCCUPATIONAL THERAPY EVALUATION  Type of Visit: Evaluation    See electronic medical record for Abuse and Falls Screening details.    Subjective      Presenting condition or subjective complaint: R Humerus Fx and RN Palsy  Date of onset: 01/13/24 (Fracture)    Relevant medical history: Depression   Dates & types of surgery:      Prior diagnostic imaging/testing results: MRI; X-ray     Prior therapy history for the same diagnosis, illness or injury: No      Living Environment  Social support: -- (With roommate)   Type of home:     Stairs to enter the home:         Ramp:     Stairs inside the home:         Help at home:    Equipment owned:       Employment:   Nurse  Hobbies/Interests:      Patient goals for therapy: Use my right arm    Pain assessment: See objective evaluation for additional pain details     Objective   Right hand dominant  Patient reports symptoms of pain, stiffness/loss of motion, weakness/loss of strength, numbness, and tingling     Pain Level (Scale 0-10)   2/22/2024   At Rest 0-1   With Use 0-1     Pain Description  Date 2/22/2024   Location R Arm   Pain Quality Discomfort, numb,    Frequency intermittent, constant, or daily     Pain is worst  daytime or nighttime   Exacerbated by  Trying to move arm   Relieved by None, rest. Not too problematic   Progression improving     Edema  None    Sensation   Decreased Radial Nerve distribution per pt report - numb to light touch through RN distribution    ROM  Pain Report: - none  + mild    ++ moderate    +++ severe   Elbow 2/22/2024   AROM (PROM) Right   Extension -15   Flexion 120 in splint   Supination NT   Pronation NT     ROM  Pain Report: - none  + mild    ++ moderate    +++ severe   Wrist 2/22/2024   AROM (PROM) Right   Extension 75   Flexion 0 GE   RD NT   UD NT   Supination 35 GE   Pronation 80     ROM  Hand 2/22/2024   AROM(PROM) Right   Index MP -45/   PIP /   DIP /   STALLWORTH    Long MP -45/   PIP /   DIP /   STALLWORTH    Ring MP -45/   PIP /   DIP /    STALLWORTH    Small MP -45/   PIP /   DIP /   STALLWORTH      ROM  Thumb 2/22/2024   AROM  (PROM) Right   MP -37/65   IP -35/65   RABD    PABD 35     Strength  Deferred due to humerus fracture    Assessment & Plan   CLINICAL IMPRESSIONS  Medical Diagnosis: R Humerus Fracture and Radial Nerve Palsy    Treatment Diagnosis: R Humerus Fracture and Radial Nerve Palsy    Impression/Assessment: Pt is a 28 year old male presenting to Occupational Therapy due to Right humerus fracture and subsequent radial nerve palsy.  The following significant findings have been identified: Impaired coordination, Impaired motor control, Impaired ROM, Impaired sensation, Impaired strength, Pain, and post fracture precautions .  These identified deficits interfere with their ability to perform self care tasks, work tasks, recreational activities, household chores, driving ,  yard work, and meal planning and preparation as compared to previous level of function.     Clinical Decision Making (Complexity):  Assessment of Occupational Performance: 5 or more Performance Deficits  Occupational Performance Limitations: bathing/showering, toileting, dressing, feeding, hygiene and grooming, home establishment and management, meal preparation and cleanup, work, and leisure activities  Clinical Decision Making (Complexity): Moderate complexity    PLAN OF CARE  Treatment Interventions:  Therapeutic Exercise:  AROM, AAROM, PROM, Tendon Gliding, Blocking, Extensor Tracking, Isotonics, and Isometrics  Neuromuscular re-education:  Nerve Gliding and Kinesiotaping  Manual Techniques:  Coordination/Dexterity and Myofascial release  Orthotic Fabrication:  Forearm based  Self Care:  Self Care Tasks and Work Tasks    Long Term Goals          Frequency of Treatment: 1 x week  Duration of Treatment: 12 weeks     Education Assessment: Learner/Method: Patient     Risks and benefits of evaluation/treatment have been explained.   Patient/Family/caregiver agrees with Plan of Care.      Evaluation Time:    OT Carla, Low Complexity Minutes (08605): 25    Signing Clinician: Elizabeth Cash OT

## 2024-02-22 NOTE — TELEPHONE ENCOUNTER
----- Message from Chaka Beckwith MD sent at 2/22/2024  3:07 PM CST -----  His vitamin D is at the low end of normal.  I still would advise him to take a vitamin D supplement until the arm is completely healed.

## 2024-02-22 NOTE — TELEPHONE ENCOUNTER
- A call was placed to the patient.     - His vitamin D is at the low end of normal.  I still would advise him to take a vitamin D supplement until the arm is completely healed. He will take a supplement in addition to his multivitamin for a total of 2000IU/day.     - Patient verbalized understanding of plan and all questions were answered. Call back number to clinic was given and patient was told to call if they had an further questions.

## 2024-02-26 ENCOUNTER — DOCUMENTATION ONLY (OUTPATIENT)
Dept: ORTHOPEDICS | Facility: CLINIC | Age: 29
End: 2024-02-26
Payer: COMMERCIAL

## 2024-02-26 NOTE — PROGRESS NOTES
Received Completed forms Yes   Faxed Forms Faxed To: [place]  Fax Number: [phone]   Sent to [facility] (Date) 2/26/24

## 2024-03-06 ENCOUNTER — THERAPY VISIT (OUTPATIENT)
Dept: OCCUPATIONAL THERAPY | Facility: CLINIC | Age: 29
End: 2024-03-06
Payer: COMMERCIAL

## 2024-03-06 DIAGNOSIS — G58.9 NERVE PALSY: Primary | ICD-10-CM

## 2024-03-06 DIAGNOSIS — S42.351A CLOSED DISPLACED COMMINUTED FRACTURE OF SHAFT OF RIGHT HUMERUS, INITIAL ENCOUNTER: Primary | ICD-10-CM

## 2024-03-06 PROCEDURE — 97535 SELF CARE MNGMENT TRAINING: CPT | Mod: GO | Performed by: OCCUPATIONAL THERAPIST

## 2024-03-06 PROCEDURE — 97110 THERAPEUTIC EXERCISES: CPT | Mod: GO | Performed by: OCCUPATIONAL THERAPIST

## 2024-03-07 DIAGNOSIS — G58.9 NERVE PALSY: Primary | ICD-10-CM

## 2024-03-27 ENCOUNTER — OFFICE VISIT (OUTPATIENT)
Dept: ORTHOPEDICS | Facility: CLINIC | Age: 29
End: 2024-03-27
Payer: COMMERCIAL

## 2024-03-27 ENCOUNTER — ANCILLARY PROCEDURE (OUTPATIENT)
Dept: GENERAL RADIOLOGY | Facility: CLINIC | Age: 29
End: 2024-03-27
Attending: ORTHOPAEDIC SURGERY
Payer: COMMERCIAL

## 2024-03-27 DIAGNOSIS — S42.351A CLOSED DISPLACED COMMINUTED FRACTURE OF SHAFT OF RIGHT HUMERUS, INITIAL ENCOUNTER: ICD-10-CM

## 2024-03-27 DIAGNOSIS — G58.9 NERVE PALSY: Primary | ICD-10-CM

## 2024-03-27 PROCEDURE — 73060 X-RAY EXAM OF HUMERUS: CPT | Mod: RT | Performed by: RADIOLOGY

## 2024-03-27 PROCEDURE — 99213 OFFICE O/P EST LOW 20 MIN: CPT | Performed by: ORTHOPAEDIC SURGERY

## 2024-03-27 NOTE — PROGRESS NOTES
Orthopedic Clinic Note     DOI: 1/13/2024  Mechanism: throwing a baseball     CC: follow up right humerus fracture    29-year-old right-hand-dominant individual who presents today for follow-up of a right humerus fracture with radial nerve palsy.  He is now approximately 10-1/2 weeks out from injury, undergoing nonoperative treatment.  He states he is continue to wear his Alexander brace at all times except for skin checks and hygiene.  He has remained nonweightbearing.  He states his pain is completely gone at the fracture site.  He does not feel any motion.  He notes he still has no return of finger or wrist extension or radial deviation of the wrist.  He is wearing his wrist splint today.  He states he has been going to hand therapy and doing home exercises.  He endorses ongoing numbness over the radial aspect of the forearm and thumb.  He notes he has been able to work with restrictions.    On exam today the patient is alert and oriented no acute distress.  He is resting comfortably.  He has a well-fitting Alexander brace in place.  This was removed.  He has no tenderness to palpation at the fracture site.  Very slight motion in the AP dimension at the fracture site with manipulation of the proximal and distal aspects of the humerus.  No pain with this.  Sensation intact in the median/ulnar distributions.  Diminished in the radial distribution.  0 out of 5 strength of wrist extensors, finger extensors and EPL.  0/5 brachioradialis.  Able to make a fist, fire FPL and IO.  Radial pulse 2+.    Review of imaging including x-ray right humerus demonstrates interval healing of right humeral shaft fracture with increased callus formation, no increase in angulation on the AP or lateral, 13 degrees deformity in the ML dimension.    29-year-old right-hand-dominant male with a right midshaft humerus fracture with radial nerve palsy sustained on 1/13/24, now 10-1/2 weeks out from injury with ongoing nerve palsy.       Fracture appears to be healing well.  Alignment is stable and meets nonoperative indications.  We discussed the importance of ongoing hand therapy and wrist splint wear.  He will continue working on home exercises coming out of the wrist brace to work on wrist and finger range of motion. Continue 2000 international unit(s) Vit D.  Okay to discontinue Alexander brace.  Okay to advance weightbearing on the right upper extremity as tolerated.  Discussed using pain and discomfort as his guide.  At this time given no return of function of his radial nerve, we will order nerve conduction studies to evaluate the radial nerve.  If there is some function present, we will continue to observe.  If no function is seen consistent with laceration of the nerve, we will refer him to our upper extremity colleagues to discuss options including possible nerve repair.    - Follow up virtually following nerve conduction studies to discuss next steps in care.    Patient seen and examined with Dr. Beckwith who agrees with the above assessment and plan.     Mily Marie MD, PGY4  Orthopedic Surgery

## 2024-03-27 NOTE — LETTER
3/27/2024         RE: Dariusz Marshall  [address]  [place] [address]        Dear Colleague,    Thank you for referring your patient, Dariusz Marshall, to the [facility] [place]. Please see a copy of my visit note below.    I was present with the resident during the history and exam.  I discussed the case with the resident and agree with the findings as documented in the assessment and plan.    Orthopedic Clinic Note     DOI: 1/13/2024  Mechanism: throwing a baseball     CC: follow up right humerus fracture    29-year-old right-hand-dominant individual who presents today for follow-up of a right humerus fracture with radial nerve palsy.  He is now approximately 10-1/2 weeks out from injury, undergoing nonoperative treatment.  He states he is continue to wear his Alexander brace at all times except for skin checks and hygiene.  He has remained nonweightbearing.  He states his pain is completely gone at the fracture site.  He does not feel any motion.  He notes he still has no return of finger or wrist extension or radial deviation of the wrist.  He is wearing his wrist splint today.  He states he has been going to hand therapy and doing home exercises.  He endorses ongoing numbness over the radial aspect of the forearm and thumb.  He notes he has been able to work with restrictions.    On exam today the patient is alert and oriented no acute distress.  He is resting comfortably.  He has a well-fitting Alexander brace in place.  This was removed.  He has no tenderness to palpation at the fracture site.  Very slight motion in the AP dimension at the fracture site with manipulation of the proximal and distal aspects of the humerus.  No pain with this.  Sensation intact in the median/ulnar distributions.  Diminished in the radial distribution.  0 out of 5 strength of wrist extensors, finger extensors and EPL.  0/5 brachioradialis.  Able to make a fist, fire [employer] and IO.  Radial  pulse 2+.    Review of imaging including x-ray right humerus demonstrates interval healing of right humeral shaft fracture with increased callus formation, no increase in angulation on the AP or lateral, 13 degrees deformity in the ML dimension.    29-year-old right-hand-dominant male with a right midshaft humerus fracture with radial nerve palsy sustained on 1/13/24, now 10-1/2 weeks out from injury with ongoing nerve palsy.      Fracture appears to be healing well.  Alignment is stable and meets nonoperative indications.  We discussed the importance of ongoing hand therapy and wrist splint wear.  He will continue working on home exercises coming out of the wrist brace to work on wrist and finger range of motion. Continue 2000 international unit(s) Vit D.  Okay to discontinue Alexander brace.  Okay to advance weightbearing on the right upper extremity as tolerated.  Discussed using pain and discomfort as his guide.  At this time given no return of function of his radial nerve, we will order nerve conduction studies to evaluate the radial nerve.  If there is some function present, we will continue to observe.  If no function is seen consistent with laceration of the nerve, we will refer him to our upper extremity colleagues to discuss options including possible nerve repair.    - Follow up virtually following nerve conduction studies to discuss next steps in care.    Patient seen and examined with Dr. Beckwith who agrees with the above assessment and plan.     Mily Marie MD, PGY4  Orthopedic Surgery         Chaka Beckwith MD

## 2024-03-27 NOTE — NURSING NOTE
Reason For Visit:   Chief Complaint   Patient presents with    RECHECK     Follow up right humerus fracture. Injured on 1/12/24. Been seeing hand therapy for nerve palsy        29 year old  1995    Primary MD: Pawel Mcdonough.      There were no vitals taken for this visit.    Pain Assessment  Patient Currently in Pain: Diana Kelley, ATC

## 2024-04-02 ENCOUNTER — THERAPY VISIT (OUTPATIENT)
Dept: OCCUPATIONAL THERAPY | Facility: CLINIC | Age: 29
End: 2024-04-02
Payer: COMMERCIAL

## 2024-04-02 DIAGNOSIS — R29.898 RIGHT HAND WEAKNESS: Primary | ICD-10-CM

## 2024-04-02 PROCEDURE — 97110 THERAPEUTIC EXERCISES: CPT | Mod: GO | Performed by: OCCUPATIONAL THERAPIST

## 2024-04-17 ENCOUNTER — TRANSFERRED RECORDS (OUTPATIENT)
Dept: HEALTH INFORMATION MANAGEMENT | Facility: CLINIC | Age: 29
End: 2024-04-17
Payer: COMMERCIAL

## 2024-04-24 ENCOUNTER — OFFICE VISIT (OUTPATIENT)
Dept: ORTHOPEDICS | Facility: CLINIC | Age: 29
End: 2024-04-24
Payer: COMMERCIAL

## 2024-04-24 DIAGNOSIS — G58.9 NERVE PALSY: Primary | ICD-10-CM

## 2024-04-24 DIAGNOSIS — S42.351A CLOSED DISPLACED COMMINUTED FRACTURE OF SHAFT OF RIGHT HUMERUS, INITIAL ENCOUNTER: ICD-10-CM

## 2024-04-24 PROCEDURE — 99213 OFFICE O/P EST LOW 20 MIN: CPT | Mod: GC | Performed by: ORTHOPAEDIC SURGERY

## 2024-04-24 NOTE — LETTER
4/24/2024         RE: Dariusz Marshall  [address]  [place] [address]        Dear Colleague,    Thank you for referring your patient, Dariusz Marshall, to the [facility] [place]. Please see a copy of my visit note below.    I was present with the resident during the history and exam.  I discussed the case with the resident and agree with the findings as documented in the assessment and plan.    Orthopedic Clinic Note     DOI: 1/13/2024  Mechanism: throwing a baseball     CC: follow up right humerus fracture    29-year-old right-hand-dominant individual who presents today for follow-up of a right humerus fracture with radial nerve palsy.  He is now approximately 15 weeks out from injury, undergoing nonoperative treatment.  Since his last visit he had an EMG performed.  He states he saw hand therapy once who instructed him to make a return appointment once his function started to return.  He has continued to wear his forearm-based wrist splint.  He denies any pain at the fracture site.  Endorses ongoing numbness over the radial forearm and thumb.  He states he feels like some wrist extensor function has started to return as he can hold it up a little bit against gravity.  Otherwise he denies any changes since his last visit.    On exam today the patient is alert and oriented no acute distress.  He is resting comfortably.    He has no tenderness to palpation at the fracture site.  No pain or motion with manipulation of the fracture site.  Sensation intact in the median/ulnar distributions.  Diminished in the radial distribution.  0 out of 5 strength of wrist extensors, finger extensors and EPL.  1/5 brachioradialis.  Able to make a fist, fire [employer] and IO.  Radial pulse 2+.    No new imaging today.    29-year-old right-hand-dominant male with a right midshaft humerus fracture with radial nerve palsy sustained on 1/13/24, now 15 weeks out from injury with EMG results demonstrating  severe right radial neuropathy proximal to the spiral groove but an intact radial nerve.  Patient can fire the brachioradialis today demonstrating some improvement.    Given his EMG and physical exam findings, we discussed ongoing conservative treatment with monitoring.  We discussed that it will take months for the nerve to heal and his function to return.  He will schedule a new hand therapy visit once he regains more function in the hand.  He will continue to wear his wrist brace.  With regards to his fracture, we discussed that he is okay to do activities as tolerated.  He may play softball.    We will have him follow-up in 3 months for repeat physical exam to monitor the return of his nerve function with new x-rays of the humerus at that time.    Patient seen and examined with Dr. Beckwith who agrees with the above assessment and plan.     Mily Marie MD, PGY4  Orthopedic Surgery           Chaka Beckwith MD

## 2024-04-24 NOTE — NURSING NOTE
Reason For Visit:   Chief Complaint   Patient presents with    RECHECK     Review EMG and discuss plan       29 year old  1995        There were no vitals taken for this visit.    Pain Assessment  Patient Currently in Pain: Diana Kelley, ATC

## 2024-04-25 ENCOUNTER — THERAPY VISIT (OUTPATIENT)
Dept: OCCUPATIONAL THERAPY | Facility: CLINIC | Age: 29
End: 2024-04-25
Payer: COMMERCIAL

## 2024-04-25 DIAGNOSIS — R29.898 RIGHT HAND WEAKNESS: Primary | ICD-10-CM

## 2024-04-25 PROCEDURE — 97110 THERAPEUTIC EXERCISES: CPT | Mod: GO | Performed by: OCCUPATIONAL THERAPIST

## 2024-05-22 ENCOUNTER — THERAPY VISIT (OUTPATIENT)
Dept: OCCUPATIONAL THERAPY | Facility: CLINIC | Age: 29
End: 2024-05-22
Payer: COMMERCIAL

## 2024-05-22 DIAGNOSIS — R29.898 RIGHT HAND WEAKNESS: Primary | ICD-10-CM

## 2024-05-22 PROCEDURE — 97110 THERAPEUTIC EXERCISES: CPT | Mod: GO | Performed by: OCCUPATIONAL THERAPIST

## 2024-05-22 NOTE — PROGRESS NOTES
05/22/24 0500   Appointment Info   Treating Provider Brooke Groves, OTD, OTR/L, CHT   Total/Authorized Visits 12 (POC)   Visits Used 5   Medical Diagnosis R Humerus Fracture and Radial Nerve Palsy   OT Tx Diagnosis R Humerus Fracture and Radial Nerve Palsy   Progress Note/Certification   Onset of Illness/Injury or Date of Surgery 01/13/24  (Fracture)   Therapy Frequency 1 x week   Predicted Duration 12 weeks   Progress Note Due Date 04/21/24   Progress Note Completed Date 02/22/24       Present No   Subjective Report   Subjective Report Patient is feeling ready to return to work. He is now able to don a glove on his right hand and is able to hold items  more easily due to improved wrist strength.   Objective Measures   Objective Measures Objective Measure 1;Objective Measure 2;Objective Measure 3;Objective Measure 4;Objective Measure 5;Objective Measure 6   Objective Measure 1   Objective Measure Wrist extension AROM   Details 25 degrees, tested in standard position   Objective Measure 2   Objective Measure Finger extension AROM   Details Able to extend finger MCP joints from 85 to 70 degrees in a gravity-eliminated plane.   Objective Measure 3   Objective Measure Wrist extension MMT   Details 3+/5  (This is improved from 2-/5 on 4/25)   Objective Measure 4   Objective Measure Finger extension MMT   Details 2-/5   Objective Measure 5   Objective Measure Thumb extension MMT   Details 0/5   Treatment Interventions (OT)   Interventions Therapeutic Procedure/Exercise   Therapeutic Procedure/Exercise   Therapeutic Procedure: strength, endurance, ROM, flexibillity minutes (27658) 25   PTRx Ther Proc 1 Wrist extension isotonic   PTRx Ther Proc 1 - Details Using 8oz bottle for resistance. 2 sets, 10 reps, HEP   PTRx Ther Proc 2 Wrist extension isometric   PTRx Ther Proc 2 - Details Hold wrist in neutral position over edge of table. Apply resistance using other hand. Hold against resistance for 10  seconds. 5-8 reps, HEP.   PTRx Ther Proc 3 Active Range of Motion Combined Radial and Ulnar Deviation   PTRx Ther Proc 3 - Details 3 sets, 10 reps, HEP   PTRx Ther Proc 4 Finger MCP extension AROM   PTRx Ther Proc 4 - Details With forearm in neutral position, patient attempted to transition from composite fist to hook fist position. Revivewed in clinic, 2 sets, 10 reps. Continue for HEP   PTRx Ther Proc 6 Forearm PROM Advanced Flexor Stretch   PTRx Ther Proc 6 - Details Continue stretch due to tightness noted in finger flexors. 2 reps 10 second hold, HEP   PTRx Ther Proc 7 Thumb Passive Range of Motion Composite Extension   PTRx Ther Proc 7 - Details Continue for HEP   PTRx Ther Proc 8 Thumb Passive Range of Motion Radial Abduction   PTRx Ther Proc 8 - Details Continue for HEP   Skilled Intervention Progressed exercises per pattern of return   Education   Learner/Method Patient   Plan   Home program See PTRx   Plan for next session Progress wrist and finger extension ROM/strength   Total Session Time   Timed Code Treatment Minutes 25   Total Treatment Time (sum of timed and untimed services) 25

## 2024-05-29 ENCOUNTER — DOCUMENTATION ONLY (OUTPATIENT)
Dept: ORTHOPEDICS | Facility: CLINIC | Age: 29
End: 2024-05-29
Payer: COMMERCIAL

## 2024-05-29 NOTE — PROGRESS NOTES
Received Completed forms Yes   Faxed Forms Faxed To: [place]  Fax Number: [phone]   Sent to HIM (Date) 5/28/24

## 2024-06-12 ENCOUNTER — MYC MEDICAL ADVICE (OUTPATIENT)
Dept: DERMATOLOGY | Facility: CLINIC | Age: 29
End: 2024-06-12

## 2024-06-12 ENCOUNTER — MYC REFILL (OUTPATIENT)
Dept: DERMATOLOGY | Facility: CLINIC | Age: 29
End: 2024-06-12

## 2024-06-12 DIAGNOSIS — L63.9 AA (ALOPECIA AREATA): ICD-10-CM

## 2024-06-12 DIAGNOSIS — M35.9 AUTOIMMUNE DISEASE (H): ICD-10-CM

## 2024-06-12 NOTE — TELEPHONE ENCOUNTER
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 Had a period of significant hair loss, following mononucleosis in summer (and stopping xeljanz). Since restarting xeljanz, he has noticed significant regrowth, especially on temporal scalp. Last xeljanz labs 10/10/22.   On exam, some papules and erythema noticeable, encouraged restarting ketoconazole as soon as he gets refill  -Repeat labs today  -Patient interested in microneedling. Discussed that this could be done in 4-5 months in [facility] if no significant improvement in hair regrowth. One side microneedling, other side with ILK injections  - Continue ketoconazole 2% shampoo every other day, refilled today  -Continue Xeljanz BID  -Continue minoxidil topical solution every day  -Restart clobetasol on area on vertex scalp with most hair loss  - HairMetrix completed 10/10/22     #[place]'s disease, chest; flaring up now  -triamcinolone cream prn, refilled today  -ketoconazole shampoo chest as well     #Elevated blood pressure value, 140/76  -Recommended re-checking at home, outside of clinic. If continued elevated blood pressures, recommend seeing PCP     Procedures Performed:   N/A     Follow-up: 4-5 month(s) in-person, or earlier for new or changing lesions

## 2024-06-14 ENCOUNTER — OFFICE VISIT (OUTPATIENT)
Dept: DERMATOLOGY | Facility: CLINIC | Age: 29
End: 2024-06-14
Payer: COMMERCIAL

## 2024-06-14 VITALS — SYSTOLIC BLOOD PRESSURE: 121 MMHG | HEART RATE: 62 BPM | DIASTOLIC BLOOD PRESSURE: 73 MMHG

## 2024-06-14 DIAGNOSIS — E55.9 VITAMIN D DEFICIENCY, UNSPECIFIED: ICD-10-CM

## 2024-06-14 DIAGNOSIS — Z51.81 MEDICATION MONITORING ENCOUNTER: ICD-10-CM

## 2024-06-14 DIAGNOSIS — L63.9 AA (ALOPECIA AREATA): Primary | ICD-10-CM

## 2024-06-14 DIAGNOSIS — L63.9 AA (ALOPECIA AREATA): ICD-10-CM

## 2024-06-14 DIAGNOSIS — Z79.622 LONG-TERM CURRENT USE OF TOFACITINIB: ICD-10-CM

## 2024-06-14 DIAGNOSIS — M35.9 AUTOIMMUNE DISEASE (H): ICD-10-CM

## 2024-06-14 PROCEDURE — 99214 OFFICE O/P EST MOD 30 MIN: CPT | Performed by: DERMATOLOGY

## 2024-06-14 ASSESSMENT — PAIN SCALES - GENERAL: PAINLEVEL: NO PAIN (0)

## 2024-06-14 NOTE — PROGRESS NOTES
[facility] Dermatology Note  Encounter Date: Jun 14, 2024  Office Visit     Dermatology Problem List:  1. Alopecia areata  - Current tx: Xeljanz (Started mid march 2022),minoxidil solution, ketoconazole shampoo, clobetasol  - Prev. tx: intermittent ILK  - Labs: Xeljanz safety labs, last ordered 6/14/24  - HairMetrix 10/10/22  2. History of hypovitaminosis D  3. History of Santos's disease, biopsy completed at [facility]  ____________________________________________    Assessment & Plan:     # non-scaring Alopecia secondary to Alopecia Areata, significant improvement today.   On Xeljanz since mid March 2022. Had a period of significant hair loss, following mononucleosis in summer (and stopping xeljanz). Since restarting xeljanz, he has noticed significant regrowth, especially on temporal scalp. Last xeljanz labs 10/10/22.   On exam, some papules and erythema noticeable, encouraged restarting ketoconazole as soon as he gets refill  -Repeat safety labs today  - Continue ketoconazole 2% shampoo every other day  - Continue Xeljanz 5mg BID, discussed future potential switch to baricitinb  - Continue minoxidil topical solution every day  - HairMetrix completed 10/10/22    Procedures Performed:   None    Follow-up: 6 months    Staff and Medical Student:     Pavel Minor, visiting MS4  Patient seen and evaluated with attending physician    Staff Physician:  I was present with the medical student who participated in the service and in the documentation of the note. I have verified the history and personally performed the physical exam and medical decision making. I agree with the assessment and plan of care as documented in the note.           Nelly Madrid MD  Professor  Department of Dermatology  [facility]: Phone: [phone], Fax:[phone]  [facility]: Phone: [phone], Fax:  [phone]        ____________________________________________    CC: Hair Loss (Notes things have been going very well/Now, he notes there is regrowth/Needs refills on Xeljanz and ketoconazole shampoo)    HPI:  Mr. Dariusz Marshall is a 29 year old male who presents as a return patient for follow-up of hair loss, diagnosed as non-scarring alopecia. He reports that his condition has been improving since he was last seen a year ago on 5/2/23, and reports that he has been observing new hair growth. He denies any recent flares of hair shedding, and denies any flaking, redness, and irritation. Regarding his vela density, he describes the density as normal from his perspective.   - Last seen 5/2/2023 in-clinic  - Shedding or thinning, or both: none, significantly improved from previous visit.   - Current tx: ketoconazole shampoo every other day, Xeljanz 5 mg BID, and minoxidil solution 1-2 times daily     No Any new medications, supplements, or products? (please list below)     No Scalp pain   No Scalp burning   No Scalp itching    No Eyebrow changes    No Eyelash changes   No Beard changes    No Other body hair changes    No Nail changes    No Additional symptoms? (please list below)     - Overall course: Improved from prior visit.   - COVID status: No    Patient is otherwise feeling well, in usual state of health, and has no additional skin concerns today.    Labs:  CBC, CMP, and lipid panel from 5/2/23 reviewed.    Physical Exam:  Vitals: /73   Pulse 62   GEN: Well developed, well-nourished, in no acute distress, in a pleasant mood.    SKIN: Scalp, face and hand exam was performed.  - no diffuse erythema   - no perifollicular erythema  - no perifollicular scale   - no scaling of the scalp   - negative hair pull test   - mildly reduced eyelash density, non-uniform  - reduced eyebrow density  - no nail pitting or dystrophy   - no scalp folliculitis/pustules   - In comparison to prior photographs, patient exhibits  significant improvement on examination today.   - No other lesions of concern on areas examined.   SALT score: (modified)  - crown: 18%  - posterior scalp: 12%  - Right side: 8%  - Left side: 12%  SALT 50    Medications:  Current Outpatient Medications   Medication Sig Dispense Refill    DESCOVY 200-25 MG per tablet Take 1 tablet by mouth daily at 2 pm      ketoconazole (NIZORAL) 2 % external cream Apply topically daily Nizoral in body wash form and shampoo form.      Minoxidil (ROGAINE MENS) 5 % FOAM Apply to scalp daily after showering 1 Can 11    tofacitinib (XELJANZ) 5 MG tablet Take 1 tablet (5 mg) by mouth 2 times daily 180 tablet 3    triamcinolone (KENALOG) 0.1 % external cream Apply topically 2 times daily (Patient taking differently: Apply topically as needed) 60 g 3    clindamycin (CLEOCIN T) 1 % lotion Apply  topically daily. Apply to face in morning. (Patient not taking: Reported on 1/16/2024) 60 mL 12    fexofenadine (ALLEGRA) 180 MG tablet Take 180 mg by mouth daily (Patient not taking: Reported on 6/14/2024)      Multiple Vitamins-Minerals (MULTIVITAMIN & MINERAL PO) Take 1 tablet by mouth daily (Patient not taking: Reported on 6/14/2024)      oxyCODONE (ROXICODONE) 5 MG tablet  (Patient not taking: Reported on 6/14/2024)      oxyCODONE-acetaminophen (PERCOCET) 5-325 MG tablet TAKE 1 TABLET BY MOUTH EVERY 4 HOURS FOR 7 DAYS AS NEEDED FOR PAIN (Patient not taking: Reported on 3/27/2024)      tretinoin (RETIN-A) 0.025 % external cream  (Patient not taking: Reported on 6/14/2024)       No current facility-administered medications for this visit.      Past Medical History:   Patient Active Problem List   Diagnosis    AA (alopecia areata)    Autoimmune disease (H24)    Vitamin D deficiency    [place]'s disease    Contact with and (suspected) exposure to human immunodeficiency virus (hiv)    Right hand weakness     Past Medical History:   Diagnosis Date    Alopecia areata     [place]'s disease      Osteomyelitis (H)     1st grade, leg, after strep throat       CC Pawel Mcdonough MD  [address] [place] [address] on close of this encounter.

## 2024-06-14 NOTE — NURSING NOTE
Dermatology Rooming Note    Dariusz Marshall's goals for this visit include:   Chief Complaint   Patient presents with    Hair Loss     Notes things have been going very well  Now, he notes there is regrowth  Needs refills on Xeljanz and ketoconazole aleida Apodaca LPN

## 2024-06-14 NOTE — LETTER
6/14/2024       RE: Dariusz Marshall  [address]  [place] [address]     Dear Colleague,    Thank you for referring your patient, Dariusz Marshall, to the [facility] DERMATOLOGY CLINIC [place] at [facility]. Please see a copy of my visit note below.    [facility] Dermatology Note  Encounter Date: Jun 14, 2024  Office Visit     Dermatology Problem List:  1. Alopecia areata  - Current tx: Xeljanz (Started mid march 2022),minoxidil solution, ketoconazole shampoo, clobetasol  - Prev. tx: intermittent ILK  - Labs: Xeljanz safety labs, last ordered 6/14/24  - HairMetrix 10/10/22  2. History of hypovitaminosis D  3. History of [place]'s disease, biopsy completed at [facility]  ____________________________________________    Assessment & Plan:     # non-scaring Alopecia secondary to Alopecia Areata, significant improvement today.   On Xeljanz since mid March 2022. Had a period of significant hair loss, following mononucleosis in summer (and stopping xeljanz). Since restarting xeljanz, he has noticed significant regrowth, especially on temporal scalp. Last xeljanz labs 10/10/22.   On exam, some papules and erythema noticeable, encouraged restarting ketoconazole as soon as he gets refill  -Repeat safety labs today  - Continue ketoconazole 2% shampoo every other day  - Continue Xeljanz 5mg BID, discussed future potential switch to baricitinb  - Continue minoxidil topical solution every day  - HairMetrix completed 10/10/22    Procedures Performed:   None    Follow-up: 6 months    Staff and Medical Student:     Pavel Minor, visiting MS4  Patient seen and evaluated with attending physician    Staff Physician:  I was present with the medical student who participated in the service and in the documentation of the note. I have verified the history and personally performed the physical exam and medical decision making. I agree with the  assessment and plan of care as documented in the note.           Nelly Madrid MD  Professor  Department of Dermatology  [facility]: Phone: [phone], Fax:[phone]  [facility]: Phone: [phone], Fax: [phone]        ____________________________________________    CC: Hair Loss (Notes things have been going very well/Now, he notes there is regrowth/Needs refills on Xeljanz and ketoconazole shampoo)    HPI:  Mr. Dariusz Marshall is a 29 year old male who presents as a return patient for follow-up of hair loss, diagnosed as non-scarring alopecia. He reports that his condition has been improving since he was last seen a year ago on 5/2/23, and reports that he has been observing new hair growth. He denies any recent flares of hair shedding, and denies any flaking, redness, and irritation. Regarding his vela density, he describes the density as normal from his perspective.   - Last seen 5/2/2023 in-clinic  - Shedding or thinning, or both: none, significantly improved from previous visit.   - Current tx: ketoconazole shampoo every other day, Xeljanz 5 mg BID, and minoxidil solution 1-2 times daily     No Any new medications, supplements, or products? (please list below)     No Scalp pain   No Scalp burning   No Scalp itching    No Eyebrow changes    No Eyelash changes   No Beard changes    No Other body hair changes    No Nail changes    No Additional symptoms? (please list below)     - Overall course: Improved from prior visit.   - COVID status: No    Patient is otherwise feeling well, in usual state of health, and has no additional skin concerns today.    Labs:  CBC, CMP, and lipid panel from 5/2/23 reviewed.    Physical Exam:  Vitals: /73   Pulse 62   GEN: Well developed, well-nourished, in no acute distress, in a pleasant mood.    SKIN: Scalp, face and hand exam was performed.  - no diffuse  erythema   - no perifollicular erythema  - no perifollicular scale   - no scaling of the scalp   - negative hair pull test   - mildly reduced eyelash density, non-uniform  - reduced eyebrow density  - no nail pitting or dystrophy   - no scalp folliculitis/pustules   - In comparison to prior photographs, patient exhibits significant improvement on examination today.   - No other lesions of concern on areas examined.   SALT score: (modified)  - crown: 18%  - posterior scalp: 12%  - Right side: 8%  - Left side: 12%  SALT 50    Medications:  Current Outpatient Medications   Medication Sig Dispense Refill     DESCOVY 200-25 MG per tablet Take 1 tablet by mouth daily at 2 pm       ketoconazole (NIZORAL) 2 % external cream Apply topically daily Nizoral in body wash form and shampoo form.       Minoxidil (ROGAINE MENS) 5 % FOAM Apply to scalp daily after showering 1 Can 11     tofacitinib (XELJANZ) 5 MG tablet Take 1 tablet (5 mg) by mouth 2 times daily 180 tablet 3     triamcinolone (KENALOG) 0.1 % external cream Apply topically 2 times daily (Patient taking differently: Apply topically as needed) 60 g 3     clindamycin (CLEOCIN T) 1 % lotion Apply  topically daily. Apply to face in morning. (Patient not taking: Reported on 1/16/2024) 60 mL 12     fexofenadine (ALLEGRA) 180 MG tablet Take 180 mg by mouth daily (Patient not taking: Reported on 6/14/2024)       Multiple Vitamins-Minerals (MULTIVITAMIN & MINERAL PO) Take 1 tablet by mouth daily (Patient not taking: Reported on 6/14/2024)       oxyCODONE (ROXICODONE) 5 MG tablet  (Patient not taking: Reported on 6/14/2024)       oxyCODONE-acetaminophen (PERCOCET) 5-325 MG tablet TAKE 1 TABLET BY MOUTH EVERY 4 HOURS FOR 7 DAYS AS NEEDED FOR PAIN (Patient not taking: Reported on 3/27/2024)       tretinoin (RETIN-A) 0.025 % external cream  (Patient not taking: Reported on 6/14/2024)       No current facility-administered medications for this visit.      Past Medical History:    Patient Active Problem List   Diagnosis     AA (alopecia areata)     Autoimmune disease (H24)     Vitamin D deficiency     Santos's disease     Contact with and (suspected) exposure to human immunodeficiency virus (hiv)     Right hand weakness     Past Medical History:   Diagnosis Date     Alopecia areata      Columbus's disease      Osteomyelitis (H)     1st grade, leg, after strep throat       CC Pawel Mcdonough MD  [address] [place] [address] on close of this encounter.       Again, thank you for allowing me to participate in the care of your patient.      Sincerely,    Nelly Madrid MD

## 2024-06-16 RX ORDER — KETOCONAZOLE 20 MG/G
CREAM TOPICAL
Qty: 60 G | Refills: 11 | Status: SHIPPED | OUTPATIENT
Start: 2024-06-16

## 2024-06-17 ENCOUNTER — TELEPHONE (OUTPATIENT)
Dept: DERMATOLOGY | Facility: CLINIC | Age: 29
End: 2024-06-17
Payer: COMMERCIAL

## 2024-06-17 NOTE — TELEPHONE ENCOUNTER
PA Initiation    Medication: XELJANZ 5 MG PO TABS  Insurance Company: [employer] - Phone [phone] Fax [phone]  Pharmacy Filling the Rx:    Filling Pharmacy Phone:    Filling Pharmacy Fax:    Start Date: 6/17/2024    Key: [id number]

## 2024-06-20 RX ORDER — TOFACITINIB 5 MG/1
5 TABLET, FILM COATED ORAL 2 TIMES DAILY
Qty: 180 TABLET | Refills: 3 | OUTPATIENT
Start: 2024-06-20

## 2024-06-21 NOTE — TELEPHONE ENCOUNTER
Prior Authorization Approval    Medication: XELJANZ 5 MG PO TABS  Authorization Effective Date: 6/19/2024  Authorization Expiration Date: 6/18/2025  Approved Dose/Quantity: 5mg/ 60 for 30 days  Reference #: Key: [id number]   Insurance Company: [employer] - Phone [phone] Fax [phone]  Expected CoPay: $    CoPay Card Available:      Financial Assistance Needed: n/a on file  Which Pharmacy is filling the prescription: [facility] - [place], [place] - [facility] [address]   Pharmacy Notified: yes  Patient Notified: yes

## 2024-07-11 DIAGNOSIS — S42.351A CLOSED DISPLACED COMMINUTED FRACTURE OF SHAFT OF RIGHT HUMERUS, INITIAL ENCOUNTER: Primary | ICD-10-CM

## 2024-07-24 ENCOUNTER — MYC MEDICAL ADVICE (OUTPATIENT)
Dept: ORTHOPEDICS | Facility: CLINIC | Age: 29
End: 2024-07-24

## 2024-07-24 NOTE — TELEPHONE ENCOUNTER
KRISTYN left requesting a return call to reschedule missed appointment with Dr. eBckwith.    Liz Maldonado LPN

## 2024-07-25 ENCOUNTER — DOCUMENTATION ONLY (OUTPATIENT)
Dept: ORTHOPEDICS | Facility: CLINIC | Age: 29
End: 2024-07-25
Payer: COMMERCIAL

## 2024-07-25 NOTE — PROGRESS NOTES
Received Completed forms Yes   Faxed Forms Faxed To: jahaira  Fax Number: [phone]   Sent to HIM (Date) 7/25/24

## 2024-10-08 ENCOUNTER — MYC MEDICAL ADVICE (OUTPATIENT)
Dept: DERMATOLOGY | Facility: CLINIC | Age: 29
End: 2024-10-08
Payer: COMMERCIAL

## 2024-10-25 ENCOUNTER — OFFICE VISIT (OUTPATIENT)
Dept: ORTHOPEDICS | Facility: CLINIC | Age: 29
End: 2024-10-25
Payer: COMMERCIAL

## 2024-10-25 ENCOUNTER — ANCILLARY PROCEDURE (OUTPATIENT)
Dept: GENERAL RADIOLOGY | Facility: CLINIC | Age: 29
End: 2024-10-25
Attending: ORTHOPAEDIC SURGERY
Payer: COMMERCIAL

## 2024-10-25 DIAGNOSIS — S42.351D CLOSED DISPLACED COMMINUTED FRACTURE OF SHAFT OF RIGHT HUMERUS WITH ROUTINE HEALING, SUBSEQUENT ENCOUNTER: Primary | ICD-10-CM

## 2024-10-25 DIAGNOSIS — S42.351A CLOSED DISPLACED COMMINUTED FRACTURE OF SHAFT OF RIGHT HUMERUS, INITIAL ENCOUNTER: ICD-10-CM

## 2024-10-25 DIAGNOSIS — G58.9 NERVE PALSY: ICD-10-CM

## 2024-10-25 PROCEDURE — 99213 OFFICE O/P EST LOW 20 MIN: CPT | Performed by: ORTHOPAEDIC SURGERY

## 2024-10-25 PROCEDURE — 73060 X-RAY EXAM OF HUMERUS: CPT | Mod: RT | Performed by: RADIOLOGY

## 2024-10-25 NOTE — PROGRESS NOTES
This patient had a humerus fracture 9 months ago and a radial nerve palsy.  He has recovered strength in the radial nerve but still feels some tingling but no pain in the forearm down to the thumb.    On examination he is alert oriented has a normal mood and affect.  Elbow motion is within normal limits as is the wrist and hand motion.  He has some scrapes and skin lesions on the forearm which seem unrelated.    Reviewed his x-ray which is of the right humerus.  The fracture is completely healed and is to remodel.      He has no activity restrictions in that right upper extremity.  He will return to see me as needed.

## 2024-10-25 NOTE — NURSING NOTE
Reason For Visit:   Chief Complaint   Patient presents with    RECHECK     Follow up right humerus pathologic fracture with x-ray. Pt still reports numbness in right forearm and thump area          29 year old  1995      Primary MD: Pawel Mcdonough.      There were no vitals taken for this visit.        Pain Assessment  Patient Currently in Pain: Diana Kelley, ATC

## 2024-10-25 NOTE — LETTER
10/25/2024      Dariusz Marshall  [address]  [place] [address]      Dear Colleague,    Thank you for referring your patient, Dariusz Marshall, to the [facility] ORTHOPED[facility] [place]. Please see a copy of my visit note below.    This patient had a humerus fracture 9 months ago and a radial nerve palsy.  He has recovered strength in the radial nerve but still feels some tingling but no pain in the forearm down to the thumb.    On examination he is alert oriented has a normal mood and affect.  Elbow motion is within normal limits as is the wrist and hand motion.  He has some scrapes and skin lesions on the forearm which seem unrelated.    Reviewed his x-ray which is of the right humerus.  The fracture is completely healed and is to remodel.      He has no activity restrictions in that right upper extremity.  He will return to see me as needed.      Again, thank you for allowing me to participate in the care of your patient.        Sincerely,        Chaka Beckwith MD

## 2024-11-10 ENCOUNTER — HEALTH MAINTENANCE LETTER (OUTPATIENT)
Age: 29
End: 2024-11-10

## 2025-01-08 ENCOUNTER — TELEPHONE (OUTPATIENT)
Dept: ORTHOPEDICS | Facility: CLINIC | Age: 30
End: 2025-01-08
Payer: COMMERCIAL

## 2025-01-08 NOTE — TELEPHONE ENCOUNTER
/Patient was called regarding STD form received by Dr. Beckwith. We needed to confirm return to work date from patient for [employer]. Patient then stated that he was on low impact from 2/29/2024 - 4/1/2024 and then on FMLA from 4/5/2024 - 5/29/2024. Form will be updated and returned to Suzette.     Huseyin Cherry CMA

## 2025-01-08 NOTE — TELEPHONE ENCOUNTER
[facility] Call Center    Phone Message    May a detailed message be left on voicemail: yes     Reason for Call:   Patient Missed Call from Clinic He Stated. Please call Back He Available. Thanks  Action Taken: Message routed to:  [facility] ([facility] tom    Travel Screening: Not Applicable     Date of Service:

## 2025-01-09 ENCOUNTER — DOCUMENTATION ONLY (OUTPATIENT)
Dept: ORTHOPEDICS | Facility: CLINIC | Age: 30
End: 2025-01-09
Payer: COMMERCIAL

## 2025-01-09 NOTE — PROGRESS NOTES
Received Completed forms Yes   Faxed Forms Faxed To: jahaira  Fax Number: [phone]   Sent to HIM (Date) 1/9/25

## 2025-01-20 ENCOUNTER — OFFICE VISIT (OUTPATIENT)
Dept: FAMILY MEDICINE | Facility: CLINIC | Age: 30
End: 2025-01-20
Payer: COMMERCIAL

## 2025-01-20 VITALS
BODY MASS INDEX: 21.98 KG/M2 | WEIGHT: 153.5 LBS | OXYGEN SATURATION: 99 % | SYSTOLIC BLOOD PRESSURE: 154 MMHG | HEIGHT: 70 IN | DIASTOLIC BLOOD PRESSURE: 73 MMHG | HEART RATE: 79 BPM | TEMPERATURE: 97.7 F | RESPIRATION RATE: 14 BRPM

## 2025-01-20 DIAGNOSIS — Z72.52 HIGH RISK HOMOSEXUAL BEHAVIOR: Primary | ICD-10-CM

## 2025-01-20 NOTE — NURSING NOTE
"29 year old  Chief Complaint   Patient presents with    Establish Care    Consult     Talk about the injectable PrEP.        Blood pressure (!) 154/73, pulse 79, temperature 97.7  F (36.5  C), temperature source Temporal, resp. rate 14, height 1.775 m (5' 9.88\"), weight 69.6 kg (153 lb 8 oz), SpO2 99%. Body mass index is 22.1 kg/m .  Patient Active Problem List   Diagnosis    AA (alopecia areata)    Autoimmune disease    Vitamin D deficiency    Liberty's disease    Contact with and (suspected) exposure to human immunodeficiency virus (hiv)    Right hand weakness       Wt Readings from Last 2 Encounters:   01/20/25 69.6 kg (153 lb 8 oz)   01/17/24 68.5 kg (151 lb)     BP Readings from Last 3 Encounters:   01/20/25 (!) 154/73   06/14/24 121/73   09/19/23 126/83         Current Outpatient Medications   Medication Sig Dispense Refill    DESCOVY 200-25 MG per tablet Take 1 tablet by mouth daily at 2 pm      Minoxidil (ROGAINE MENS) 5 % FOAM Apply to scalp daily after showering 1 Can 11    tofacitinib (XELJANZ) 5 MG tablet Take 1 tablet (5 mg) by mouth 2 times daily 180 tablet 3    clindamycin (CLEOCIN T) 1 % lotion Apply  topically daily. Apply to face in morning. (Patient not taking: Reported on 1/20/2025) 60 mL 12    fexofenadine (ALLEGRA) 180 MG tablet Take 180 mg by mouth daily (Patient not taking: Reported on 1/20/2025)      ketoconazole (NIZORAL) 2 % external cream Ok to use as both a shampoo and body wash - apply to wet skin, lather and massage into skin, leave on for a couple of minutes and then rinse (Patient not taking: Reported on 1/20/2025) 60 g 11    Multiple Vitamins-Minerals (MULTIVITAMIN & MINERAL PO) Take 1 tablet by mouth daily (Patient not taking: Reported on 1/20/2025)      oxyCODONE (ROXICODONE) 5 MG tablet  (Patient not taking: Reported on 1/20/2025)      oxyCODONE-acetaminophen (PERCOCET) 5-325 MG tablet TAKE 1 TABLET BY MOUTH EVERY 4 HOURS FOR 7 DAYS AS NEEDED FOR PAIN (Patient not taking: Reported " "on 1/20/2025)      tretinoin (RETIN-A) 0.025 % external cream  (Patient not taking: Reported on 1/20/2025)      triamcinolone (KENALOG) 0.1 % external cream Apply topically 2 times daily (Patient not taking: Reported on 1/20/2025) 60 g 3     No current facility-administered medications for this visit.       Social History     Tobacco Use    Smoking status: Never    Smokeless tobacco: Never   Vaping Use    Vaping status: Never Used   Substance Use Topics    Alcohol use: Yes     Comment: rare    Drug use: Yes     Types: Marijuana     Comment: smoke/vape daily       Health Maintenance Due   Topic Date Due    YEARLY PREVENTIVE VISIT  09/19/2024       No results found for: \"PAP\"      January 20, 2025 3:56 PM    "

## 2025-01-20 NOTE — PROGRESS NOTES
ASSESSMENT and PLAN:    A 29-year-old  with high risk sexual behavior.  Interested in injectable preexposure HIV prophylaxis.      Plan:    Refer to the infectious disease clinic for their program for injectable PrEP.     until he gets in, he will continue with his Descovy  medication that he is receiving from an outside clinic   we reviewed his slightly elevated blood pressure today which is apparent for him.  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  He is not concerned.  He will return for an annual exam at some point in the near future.        Tarik Olivera MD  Family Medicine and Sports Medicine  [facility]      Medical assistant intake:  Dariusz Marshall is a 29 year old male who presents to [facility] today for Establish Care and Consult (Talk about the injectable PrEP. )      SUBJECTIVE:   This is my first time meeting Dariusz. 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     He reports to have  sexual encounters with men know where he is at risk for HIV and he has been on PrEP for years.  He has been taking Descovy and  is now interested in the injectable PrEP to have it injected every 2 months.      He is otherwise in his usual state of health.  He enjoys his job.      Review Of Systems:     Has otherwise been in usual state of health    Problem list per EMR:  Patient Active Problem List   Diagnosis    AA (alopecia areata)    Autoimmune disease    Vitamin D deficiency    Santos's disease    Contact with and (suspected) exposure to human immunodeficiency virus (hiv)    Right hand weakness       Current Outpatient Medications   Medication Sig Dispense Refill    DESCOVY 200-25 MG per tablet Take 1 tablet by mouth daily at 2 pm      Minoxidil (ROGAINE MENS) 5 % FOAM Apply to scalp daily after showering 1 Can 11    tofacitinib (XELJANZ) 5 MG tablet Take 1 tablet (5 mg) by mouth 2 times daily 180 tablet 3       No Known Allergies     Social:   An RN at the [employer]   "[place]    OBJECTIVE    Vitals: BP (!) 154/73 (BP Location: Left arm, Patient Position: Sitting, Cuff Size: Adult Regular)   Pulse 79   Temp 97.7  F (36.5  C) (Temporal)   Resp 14   Ht 1.775 m (5' 9.88\")   Wt 69.6 kg (153 lb 8 oz)   SpO2 99%   BMI 22.10 kg/m    BMI= Body mass index is 22.1 kg/m .    He appears well and in no distress.    SEE TOP OF NOTE FOR ASSESSMENT AND PLAN    --Tarik Olivera MD  [facility], Department of Family Medicine and Community Health  "

## 2025-01-23 NOTE — CONFIDENTIAL NOTE
DIAGNOSIS:  High risk homosexual behavior    DATE RECEIVED:  02.27.2025     NOTES (Gather within 2 years) STATUS DETAILS   OFFICE NOTE from referring provider   Internal 01.20.2025  Tarik Olivera MD    LABS (any labs) Internal    MEDICATION LIST Internal

## 2025-02-27 ENCOUNTER — PRE VISIT (OUTPATIENT)
Dept: INFECTIOUS DISEASES | Facility: CLINIC | Age: 30
End: 2025-02-27
Payer: COMMERCIAL

## 2025-02-27 ENCOUNTER — OFFICE VISIT (OUTPATIENT)
Dept: INFECTIOUS DISEASES | Facility: CLINIC | Age: 30
End: 2025-02-27
Attending: INTERNAL MEDICINE
Payer: COMMERCIAL

## 2025-02-27 VITALS
SYSTOLIC BLOOD PRESSURE: 115 MMHG | WEIGHT: 157.13 LBS | DIASTOLIC BLOOD PRESSURE: 78 MMHG | HEART RATE: 74 BPM | BODY MASS INDEX: 23.27 KG/M2 | HEIGHT: 69 IN | OXYGEN SATURATION: 97 %

## 2025-02-27 DIAGNOSIS — Z29.81 ENCOUNTER FOR HIV PRE-EXPOSURE PROPHYLAXIS: Primary | ICD-10-CM

## 2025-02-27 DIAGNOSIS — Z72.51 HIGH RISK SEXUAL BEHAVIOR, UNSPECIFIED TYPE: ICD-10-CM

## 2025-02-27 DIAGNOSIS — Z29.9 PROPHYLACTIC MEASURE: ICD-10-CM

## 2025-02-27 DIAGNOSIS — Z11.3 ENCOUNTER FOR SCREENING EXAMINATION FOR SEXUALLY TRANSMITTED INFECTION: ICD-10-CM

## 2025-02-27 PROCEDURE — 99213 OFFICE O/P EST LOW 20 MIN: CPT | Performed by: INTERNAL MEDICINE

## 2025-02-27 PROCEDURE — 87491 CHLMYD TRACH DNA AMP PROBE: CPT | Performed by: INTERNAL MEDICINE

## 2025-02-27 RX ORDER — EMTRICITABINE AND TENOFOVIR ALAFENAMIDE 200; 25 MG/1; MG/1
1 TABLET ORAL
Qty: 30 TABLET | Refills: 2 | Status: SHIPPED | OUTPATIENT
Start: 2025-02-27

## 2025-02-27 RX ORDER — SERTRALINE HYDROCHLORIDE 25 MG/1
25 TABLET, FILM COATED ORAL DAILY
COMMUNITY

## 2025-02-27 RX ORDER — DOXYCYCLINE HYCLATE 100 MG/1
100 TABLET, DELAYED RELEASE ORAL 2 TIMES DAILY PRN
COMMUNITY

## 2025-02-27 ASSESSMENT — PAIN SCALES - GENERAL: PAINLEVEL_OUTOF10: NO PAIN (0)

## 2025-02-27 NOTE — PROGRESS NOTES
[facility] INFECTIOUS DISEASE CLINIC [address] [address]  Phone: [phone]  Fax: [phone]    Patient:  Dariusz Marshall, Date of birth 1995  Date of Visit:  02/27/2025  Referring Provider Tarik Olivera          Dariusz Marshall is here today for PrEP evaluation.      Assessment & Plan/Recommendations     ID problem list:  Encounter for HIV PrEP  Encounter for STI screenings    Discussion:  Will check routine STI screenings today as well as hepatitis screenings. Discussed with patient that I will refer for him to meet with [facility] pharmacist with regards to cabotegravir for PrEP who will help ensure patient qualifies for injectable PrEP including by running his insurance benefits to ensure it is covered and checking if he has any other contraindications to changing to cabotegravir for PrEP; in meantime, will refill Descovy (FTC/TAF) for PrEP until we can establish if he is a candidate to get starting on cabotegravir.     Recs:  - sent [facility] referral to assess for appropriateness of transitioning to cabotegravir (Apretude) for PrEP if deemed an appropriate candidate (and discussed with patient that there may be some delay with changes in [facility] pharmacy staffing; also sent message to [facility] pharmacist with regards to this case)  - will refill Descovy (FTC/TAF) for PrEP in meantime while awaiting assessment for possibly transition to cabotegravir  - will check routine STI screenings today -- will perform GC/CT swabs in clinic, and requested patient go to lab as well for blood draw (CMP, HIV VL, syphilis screen, baseline hepatitis screens)  - discussed with patient our clinic protocol is generally to check HIV VL instead of HIV ag/ab for screening for cabotegravir PrEP  - follow up in ID clinic in 3 months with repeat labs/screens drawn ahead of time if able (ordered)  - continue to follow with primary care and derm for remaining management      I communicated with the  patient at this visit.      Patient was seen on 02/27/2025 in ID clinic.    55 minutes spent by me on the date of the encounter doing chart review, history and exam, documentation and further activities per the note         Michael Boggs MD  Infectious Diseases      Subjective       HPI:  Dariusz Marshall is a 29 year old male with PMH including alopecia areata, on chronic PrEP (Descovy, follows with primary care Dr. Tarik Olivera), who presents to ID clinic as referral from his primary care provider to assess for injectable PrEP.    Per patient, he has been on Descovy ~2.5 years, prior to that Truvada 1.5 years ([facility] [place] and follows at [facility] for refills. No issues with Descovy for PrEP so far. Is also on PRN doxyPEP via his primary care (gets 60 tabs at a time), last took ~1-2 weeks ago, has a whole bottle of doxy pills for that.     In terms of his history, he denies any tobacco use, occasionally smokes marijuana, occasional alcohol (1-2x/month), no IVDU. Sexually active with men (different partners, not currently monogamous), uses condoms rarely (case by case). Gets STI screenings via [facility] home testing (last ~12/2024 was positive chlamydia and s/p doxycycline course). Works as RN at [employer]. Says he is on [place] [facility] daily so shouldn't have any issues with coming to [facility] for injections/labs as needed. Also says he was treated with IM Penicillin x1 in the past due to potential syphilis exposure from prior partner (but it was empiric treatment; did not test positive for syphilis at that time).     No current symptoms - no discharge, lesions, etc. Does have chronic rash on chest that follows with dermatology (flares in the summers).       PAST MEDICAL HISTORY  Past Medical History:   Diagnosis Date    Alopecia areata     Florien's disease     Osteomyelitis (H)     1st grade, leg, after strep throat     Otherwise as per HPI    PAST SURGICAL HISTORY  Past  "Surgical History:   Procedure Laterality Date    ARTHROSCOPIC REPAIR ACL Right     OTHER SURGICAL HISTORY      Surgical debridement right knee in 1st grade for osteomyelitis    TYMPANOSTOMY Bilateral     as a child     Otherwise as per HPI    ALLERGIES AND DRUG REACTIONS  No Known Allergies    FAMILY HISTORY  No known immunocompromising conditions or infections unless listed below  family history includes Cancer (age of onset: 70) in his paternal grandmother; Cerebrovascular Disease (age of onset: 55) in his paternal grandfather; Colon Polyps in his maternal grandfather; Coronary Artery Disease (age of onset: 85) in his maternal grandmother; Depression in his brother; Diabetes Type 2  in his paternal grandmother and paternal uncle; Skin Cancer in his maternal grandmother.    SOCIAL AND FUNCTIONAL HISTORY  Social History     Tobacco Use    Smoking status: Never    Smokeless tobacco: Never   Vaping Use    Vaping status: Never Used   Substance Use Topics    Alcohol use: Yes     Comment: rare    Drug use: Yes     Types: Marijuana     Comment: smoke/vape daily     Otherwise as per HPI    CURRENT ANTIMICROBIALS  Other medications reviewed in EPIC  Descovy for PrEP  PRN doxy PEP    REVIEW OF SYSTEMS  ROS as above.    Objective   PHYSICAL EXAMINATION    /78 (BP Location: Right arm, Cuff Size: Adult Regular)   Pulse 74   Ht 1.753 m (5' 9\")   Wt 71.3 kg (157 lb 2 oz)   SpO2 97%   BMI 23.20 kg/m      Constitutional:  appearance not in acute distress   Eyes: no conjunctival injection, no scleral icterus  ENT: no nasal discharge, membranes moist-appearing  Cardiovascular: regular rate and rhythm  Pulmonary: unlabored breathing, clear to ascultation bilaterally  Gastrointestinal: abdomen non-distended appearing  Skin: minimal signs of dermatitis on chest; alopecia   Neurologic: awake, alert and interactive      Other Significant Information (Labs, cultures, radiology, etc)    Recent micro reviewed:   2/27/25 CG/CT " screenings: pending  2/27/25 Hepatitis B/C screenings: ordered  2/27/25 HIV VL: ordered  2/27/25 syphilis screen: ordered

## 2025-02-27 NOTE — NURSING NOTE
"Chief Complaint   Patient presents with    Consult     New B20      /78 (BP Location: Right arm, Cuff Size: Adult Regular)   Pulse 74   Ht 1.753 m (5' 9\")   Wt 71.3 kg (157 lb 2 oz)   SpO2 97%   BMI 23.20 kg/m    Vivien Heredia on 2/27/2025 at 1:32 PM    "

## 2025-02-27 NOTE — LETTER
2/27/2025       RE: Dariusz Marshall  [address]  [place] [address]     Dear Colleague,    Thank you for referring your patient, Dariusz Marshall, to the [facility] [place] at [facility]. Please see a copy of my visit note below.      [facility] [place]  [address] [address]  Phone: [phone]  Fax: [phone]    Patient:  Dariusz Marshall, Date of birth 1995  Date of Visit:  02/27/2025  Referring Provider Tarik Olivera          Dariusz Marshall is here today for PrEP evaluation.      Assessment & Plan/Recommendations     ID problem list:  Encounter for HIV PrEP  Encounter for STI screenings    Discussion:  Will check routine STI screenings today as well as hepatitis screenings. Discussed with patient that I will refer for him to meet with MTM pharmacist with regards to cabotegravir for PrEP who will help ensure patient qualifies for injectable PrEP including by running his insurance benefits to ensure it is covered and checking if he has any other contraindications to changing to cabotegravir for PrEP; in meantime, will refill Descovy (FTC/TAF) for PrEP until we can establish if he is a candidate to get starting on cabotegravir.     Recs:  - sent MTM referral to assess for appropriateness of transitioning to cabotegravir (Apretude) for PrEP if deemed an appropriate candidate (and discussed with patient that there may be some delay with changes in MTM pharmacy staffing; also sent message to MTM pharmacist with regards to this case)  - will refill Descovy (FTC/TAF) for PrEP in meantime while awaiting assessment for possibly transition to cabotegravir  - will check routine STI screenings today -- will perform GC/CT swabs in clinic, and requested patient go to lab as well for blood draw (CMP, HIV VL, syphilis screen, baseline hepatitis screens)  - discussed  with patient our clinic protocol is generally to check HIV VL instead of HIV ag/ab for screening for cabotegravir PrEP  - follow up in ID clinic in 3 months with repeat labs/screens drawn ahead of time if able (ordered)  - continue to follow with primary care and derm for remaining management      I communicated with the patient at this visit.      Patient was seen on 02/27/2025 in ID clinic.    55 minutes spent by me on the date of the encounter doing chart review, history and exam, documentation and further activities per the note         Michael Boggs MD  Infectious Diseases      Subjective       HPI:  Dariusz Marshall is a 29 year old male with PMH including alopecia areata, on chronic PrEP (Descovy, follows with primary care Dr. Tarik Olivera), who presents to ID clinic as referral from his primary care provider to assess for injectable PrEP.    Per patient, he has been on Descovy ~2.5 years, prior to that Truvada 1.5 years ([facility] [place] and follows at [facility] for refills. No issues with Descovy for PrEP so far. Is also on PRN doxyPEP via his primary care (gets 60 tabs at a time), last took ~1-2 weeks ago, has a whole bottle of doxy pills for that.     In terms of his history, he denies any tobacco use, occasionally smokes marijuana, occasional alcohol (1-2x/month), no IVDU. Sexually active with men (different partners, not currently monogamous), uses condoms rarely (case by case). Gets STI screenings via [facility] home testing (last ~12/2024 was positive chlamydia and s/p doxycycline course). Works as RN at [employer]. Says he is on [employer] [facility] [facility] daily so shouldn't have any issues with coming to [facility] [place] injections/labs as needed. Also says he was treated with IM Penicillin x1 in the past due to potential syphilis exposure from prior partner (but it was empiric treatment; did not test positive for syphilis at that time).     No current symptoms - no discharge,  "lesions, etc. Does have chronic rash on chest that follows with dermatology (flares in the summers).       PAST MEDICAL HISTORY  Past Medical History:   Diagnosis Date     Alopecia areata      [place]'s disease      Osteomyelitis (H)     1st grade, leg, after strep throat     Otherwise as per HPI    PAST SURGICAL HISTORY  Past Surgical History:   Procedure Laterality Date     ARTHROSCOPIC REPAIR ACL Right      OTHER SURGICAL HISTORY      Surgical debridement right knee in 1st grade for osteomyelitis     TYMPANOSTOMY Bilateral     as a child     Otherwise as per HPI    ALLERGIES AND DRUG REACTIONS  No Known Allergies    FAMILY HISTORY  No known immunocompromising conditions or infections unless listed below  family history includes Cancer (age of onset: 70) in his paternal grandmother; Cerebrovascular Disease (age of onset: 55) in his paternal grandfather; Colon Polyps in his maternal grandfather; Coronary Artery Disease (age of onset: 85) in his maternal grandmother; Depression in his brother; Diabetes Type 2  in his paternal grandmother and paternal uncle; Skin Cancer in his maternal grandmother.    SOCIAL AND FUNCTIONAL HISTORY  Social History     Tobacco Use     Smoking status: Never     Smokeless tobacco: Never   Vaping Use     Vaping status: Never Used   Substance Use Topics     Alcohol use: Yes     Comment: rare     Drug use: Yes     Types: Marijuana     Comment: smoke/vape daily     Otherwise as per HPI    CURRENT ANTIMICROBIALS  Other medications reviewed in [facility]  Descovy for PrEP  PRN doxy PEP    REVIEW OF SYSTEMS  ROS as above.    Objective   PHYSICAL EXAMINATION    /78 (BP Location: Right arm, Cuff Size: Adult Regular)   Pulse 74   Ht 1.753 m (5' 9\")   Wt 71.3 kg (157 lb 2 oz)   SpO2 97%   BMI 23.20 kg/m      Constitutional:  appearance not in acute distress   Eyes: no conjunctival injection, no scleral icterus  ENT: no nasal discharge, membranes moist-appearing  Cardiovascular: regular rate and " rhythm  Pulmonary: unlabored breathing, clear to ascultation bilaterally  Gastrointestinal: abdomen non-distended appearing  Skin: minimal signs of dermatitis on chest; alopecia   Neurologic: awake, alert and interactive      Other Significant Information (Labs, cultures, radiology, etc)    Recent micro reviewed:   2/27/25 CG/CT screenings: pending  2/27/25 Hepatitis B/C screenings: ordered  2/27/25 HIV VL: ordered  2/27/25 syphilis screen: ordered      Again, thank you for allowing me to participate in the care of your patient.      Sincerely,    Michael Boggs MD

## 2025-03-20 ENCOUNTER — VIRTUAL VISIT (OUTPATIENT)
Dept: PHARMACY | Facility: CLINIC | Age: 30
End: 2025-03-20
Attending: INTERNAL MEDICINE
Payer: COMMERCIAL

## 2025-03-20 DIAGNOSIS — L63.9 AA (ALOPECIA AREATA): Primary | Chronic | ICD-10-CM

## 2025-03-20 DIAGNOSIS — Z20.2 POSSIBLE EXPOSURE TO STI: ICD-10-CM

## 2025-03-20 DIAGNOSIS — Z20.6 CONTACT WITH AND (SUSPECTED) EXPOSURE TO HUMAN IMMUNODEFICIENCY VIRUS (HIV): Chronic | ICD-10-CM

## 2025-03-20 DIAGNOSIS — F99 MENTAL HEALTH DISORDER: ICD-10-CM

## 2025-03-20 RX ORDER — DIPHENHYDRAMINE HYDROCHLORIDE 50 MG/ML
50 INJECTION, SOLUTION INTRAMUSCULAR; INTRAVENOUS
Start: 2025-04-03

## 2025-03-20 RX ORDER — DIPHENHYDRAMINE HYDROCHLORIDE 50 MG/ML
25 INJECTION, SOLUTION INTRAMUSCULAR; INTRAVENOUS
Start: 2025-04-03

## 2025-03-20 RX ORDER — ALBUTEROL SULFATE 90 UG/1
1-2 INHALANT RESPIRATORY (INHALATION)
Start: 2025-04-03

## 2025-03-20 RX ORDER — EPINEPHRINE 1 MG/ML
0.3 INJECTION, SOLUTION, CONCENTRATE INTRAVENOUS EVERY 5 MIN PRN
OUTPATIENT
Start: 2025-04-03

## 2025-03-20 RX ORDER — ALBUTEROL SULFATE 0.83 MG/ML
2.5 SOLUTION RESPIRATORY (INHALATION)
OUTPATIENT
Start: 2025-04-03

## 2025-03-20 RX ORDER — METHYLPREDNISOLONE SODIUM SUCCINATE 40 MG/ML
40 INJECTION INTRAMUSCULAR; INTRAVENOUS
Start: 2025-04-03

## 2025-03-20 NOTE — PATIENT INSTRUCTIONS
"Recommendations from today's MTM visit:                                                    MTM pharmacist to check Apretude insurance coverage   Talk to your doctor about increasing your sertraline dose to 50 mg once daily and it normally takes 4-8 weeks to see some improvements.  Practice sleep hygiene, such as avoiding screen time, caffeine, or strenuous exercise before bedtime    Follow-up: MTM 1 week to review Apretude coverage     It was great speaking with you today.  I value your experience and would be very thankful for your time in providing feedback in our clinic survey. In the next few days, you may receive an email or text message from [employer] with a link to a survey related to your  clinical pharmacist.\"     To schedule another MTM appointment, please call the clinic directly or you may call the MTM scheduling line at [phone] or toll-free at [phone].     My Clinical Pharmacist's contact information:                                                      Please feel free to contact me with any questions or concerns you have.      Hima Durham, PharmD, BCGP  Medication Therapy Management Pharmacist  [facility]    "

## 2025-03-20 NOTE — PROGRESS NOTES
Medication Therapy Management (MTM) Encounter    ASSESSMENT:                            Medication Adherence/Access: No issues identified.    HIV PrEP:  Discussed the following (Apretude):   Time to therapeutic levels is not exactly known   Apretude side effects: headache, pain, tenderness, swelling at injection site  Dosing and monitor schedule   7 day window for injections  Need for oral PrEP if injections are stopped and at risk for acquiring HIV    Lab monitoring:   Baseline: HIV RNA and HIV Ag/Ab within 7 days, STI screening, hepC (MSM, TGW, PWID)  Maintenance (Apretude):  HIV Ag/Ab and HIV RNA every 2 months   STI screening every 4 to 6 months     Repeat hepatitis C screening every 12 months if risk factors     Will enter therapy plan to investigate insurance coverage of Apretude    STI PEP: stable    Mental Health/Anxiety: Patient has not noticed much change in mood, but is aware of the gradual titration needed to minimize side effects. Sleep has been not optimal likely being affected by ongoing anxiety. Encouraged patient to discuss dose titration during next appointment with prescriber.    Autoimmune/Alopecia: stable      PLAN:                          MTM pharmacist to check Apretude insurance coverage   Talk to your doctor about increasing your sertraline dose to 50 mg once daily and it normally takes 4-8 weeks to see some improvements.  Practice sleep hygiene, such as avoiding screen time, caffeine, or strenuous exercise before bedtime    Follow-up: MTM 1 week to review Apretude coverage     SUBJECTIVE/OBJECTIVE:                          Dariusz Marshall is a 30 year old male seen for an initial visit. He was referred to me from Dr. Michael Boggs.      Reason for visit: Apretude Assessment.    Allergies/ADRs: Reviewed in chart  Past Medical History: Reviewed in chart  Tobacco: He reports that he has never smoked. He has never used smokeless tobacco.  Alcohol: Less than 1 beverage / month  THC: smoking  form  Medication Adherence/Access: States he may miss doses of his medication ~2 time(s) per month, denies issues obtaining medications     HIV PrEP:   Descovy 200-25 mg once daily  Drug interactions: No  New concerns today?: No    Side effects:   HIV RNA: non-reactive (12/28/2021)  Chlamydia: negative (02/27/25)  Gonorrhea:  negative (02/27/25)  Treponema Ab: nonreactive (05/2025)  Immunizations:  Flu (12/2024), COVID (12/2024), Tdap (05/2017), PCV20, HepB, HepA (06/2023), HPV (04/2020)       STI PEP:  Doxycycline 100 mg twice daily as needed after intercourse    Patient takes as instructed and reports no issues/barriers.    Mental Health   Anxiety:  Sertraline 25 mg once daily    Started recently and will be meeting w/ provider 4/3/25, tolerating well without any reported side effects. Has not noticed any improvements in depression. Sleep is chronically poor.    Autoimmune/Alopecia:  Xeljanz 5 mg twice daily   Minoxidil 5% foam apply to scalp daily to every other daily after showering     Regimen has been effective so far, started Xeljanz ~3 years ago, and no reported issues. Prefers to apply minoxidil foam after his hair is more dry since it is less greasy.        Today's Vitals: There were no vitals taken for this visit.  ----------------      I spent 15 minutes with this patient today. All changes were made via collaborative practice agreement with Michael Boggs.     A summary of these recommendations was sent via [employer].    Hima Durham, PharmD, BCGP  Medication Therapy Management Pharmacist  [facility]       Telemedicine Visit Details  The patient's medications can be safely assessed via a telemedicine encounter.  Type of service:  Telephone visit  Originating Location (pt. Location): Home    Distant Location (provider location):  On-site  Start Time:  12:31 am  End Time:  12:46 am     Medication Therapy Recommendations  No medication therapy recommendations to display

## 2025-05-31 ENCOUNTER — HEALTH MAINTENANCE LETTER (OUTPATIENT)
Age: 30
End: 2025-05-31

## 2025-06-02 ENCOUNTER — TELEPHONE (OUTPATIENT)
Dept: DERMATOLOGY | Facility: CLINIC | Age: 30
End: 2025-06-02
Payer: COMMERCIAL

## 2025-06-02 NOTE — TELEPHONE ENCOUNTER
PA Initiation    Medication: XELJANZ 5 MG PO TABS  Insurance Company: [employer] - Phone [phone] Fax [phone]  Pharmacy Filling the Rx:    Filling Pharmacy Phone:    Filling Pharmacy Fax:    Start Date: 6/2/2025      Key: [id number]

## 2025-06-04 ENCOUNTER — PATIENT OUTREACH (OUTPATIENT)
Dept: INFECTIOUS DISEASES | Facility: CLINIC | Age: 30
End: 2025-06-04
Payer: COMMERCIAL

## 2025-06-04 NOTE — TELEPHONE ENCOUNTER
Social Work - Telephone/MyChart message  [facility]  Data: 2025  Patient Name: Dariusz Marshall  Goes By: Dariusz FARLEY/Age: 1995 (30 year old)        Reason for Referral: Missed Appointment     Intervention: Writer called Patient to follow up about missed appointment, encourage reschedule, and assess for barriers to care. Voicemail left.   Plan:  will await patient's return phone call/message and provide assistance at that time.     CHANDLER Gardner, [facility]  Infectious Disease

## 2025-06-09 NOTE — TELEPHONE ENCOUNTER
Prior Authorization Approval    Medication: XELJANZ 5 MG PO TABS  Authorization Effective Date: 6/4/2025  Authorization Expiration Date: 6/3/2026  Approved Dose/Quantity: 60 for 30 days  Reference #: Key: [id number]   Insurance Company: [employer] - Phone [phone] Fax [phone]  Expected CoPay: $    CoPay Card Available: No    Financial Assistance Needed: no  Which Pharmacy is filling the prescription: [facility] - [place], [place] - [address]  Pharmacy Notified: yes  Patient Notified: not needed-renewal no lapse in coverage

## 2025-08-28 ENCOUNTER — ALLIED HEALTH/NURSE VISIT (OUTPATIENT)
Dept: INFECTIOUS DISEASES | Facility: CLINIC | Age: 30
End: 2025-08-28
Payer: COMMERCIAL

## 2025-08-28 ENCOUNTER — LAB (OUTPATIENT)
Dept: LAB | Facility: CLINIC | Age: 30
End: 2025-08-28
Payer: COMMERCIAL

## 2025-08-28 DIAGNOSIS — Z29.9 PROPHYLACTIC MEASURE: ICD-10-CM

## 2025-08-28 DIAGNOSIS — Z20.6 CONTACT WITH AND (SUSPECTED) EXPOSURE TO HUMAN IMMUNODEFICIENCY VIRUS (HIV): Primary | ICD-10-CM

## 2025-08-28 DIAGNOSIS — Z29.81 ENCOUNTER FOR HIV PRE-EXPOSURE PROPHYLAXIS: ICD-10-CM

## 2025-08-28 DIAGNOSIS — Z11.3 ENCOUNTER FOR SCREENING EXAMINATION FOR SEXUALLY TRANSMITTED INFECTION: ICD-10-CM

## 2025-08-28 DIAGNOSIS — Z72.51 HIGH RISK SEXUAL BEHAVIOR, UNSPECIFIED TYPE: ICD-10-CM

## 2025-08-28 LAB
ALBUMIN SERPL BCG-MCNC: 4.6 G/DL (ref 3.5–5.2)
ALP SERPL-CCNC: 55 U/L (ref 40–150)
ALT SERPL W P-5'-P-CCNC: 16 U/L (ref 0–70)
ANION GAP SERPL CALCULATED.3IONS-SCNC: 11 MMOL/L (ref 7–15)
AST SERPL W P-5'-P-CCNC: 18 U/L (ref 0–45)
BILIRUB SERPL-MCNC: 0.7 MG/DL
BUN SERPL-MCNC: 12.9 MG/DL (ref 6–20)
CALCIUM SERPL-MCNC: 9.4 MG/DL (ref 8.8–10.4)
CHLORIDE SERPL-SCNC: 101 MMOL/L (ref 98–107)
CREAT SERPL-MCNC: 0.96 MG/DL (ref 0.67–1.17)
EGFRCR SERPLBLD CKD-EPI 2021: >90 ML/MIN/1.73M2
GLUCOSE SERPL-MCNC: 130 MG/DL (ref 70–99)
HBV CORE AB SERPL QL IA: NONREACTIVE
HBV SURFACE AB SERPL IA-ACNC: <3.5 M[IU]/ML
HBV SURFACE AB SERPL IA-ACNC: NONREACTIVE M[IU]/ML
HBV SURFACE AG SERPL QL IA: NONREACTIVE
HCO3 SERPL-SCNC: 28 MMOL/L (ref 22–29)
HCV AB SERPL QL IA: NONREACTIVE
POTASSIUM SERPL-SCNC: 4.6 MMOL/L (ref 3.4–5.3)
PROT SERPL-MCNC: 7.5 G/DL (ref 6.4–8.3)
SODIUM SERPL-SCNC: 140 MMOL/L (ref 135–145)
T PALLIDUM AB SER QL: NONREACTIVE

## 2025-08-28 PROCEDURE — 87591 N.GONORRHOEAE DNA AMP PROB: CPT | Performed by: INTERNAL MEDICINE

## 2025-08-28 PROCEDURE — 86704 HEP B CORE ANTIBODY TOTAL: CPT | Performed by: INTERNAL MEDICINE

## 2025-08-28 PROCEDURE — 86803 HEPATITIS C AB TEST: CPT | Performed by: INTERNAL MEDICINE

## 2025-08-28 PROCEDURE — 250N000011 HC RX IP 250 OP 636: Mod: JZ | Performed by: INTERNAL MEDICINE

## 2025-08-28 PROCEDURE — 87491 CHLMYD TRACH DNA AMP PROBE: CPT | Performed by: INTERNAL MEDICINE

## 2025-08-28 PROCEDURE — 86706 HEP B SURFACE ANTIBODY: CPT | Performed by: INTERNAL MEDICINE

## 2025-08-28 PROCEDURE — 99000 SPECIMEN HANDLING OFFICE-LAB: CPT | Performed by: PATHOLOGY

## 2025-08-28 PROCEDURE — 87340 HEPATITIS B SURFACE AG IA: CPT | Performed by: INTERNAL MEDICINE

## 2025-08-28 PROCEDURE — 87536 HIV-1 QUANT&REVRSE TRNSCRPJ: CPT | Performed by: INTERNAL MEDICINE

## 2025-08-28 PROCEDURE — 86780 TREPONEMA PALLIDUM: CPT | Performed by: INTERNAL MEDICINE

## 2025-08-28 PROCEDURE — 96372 THER/PROPH/DIAG INJ SC/IM: CPT | Performed by: INTERNAL MEDICINE

## 2025-08-28 RX ORDER — DIPHENHYDRAMINE HYDROCHLORIDE 50 MG/ML
50 INJECTION, SOLUTION INTRAMUSCULAR; INTRAVENOUS
Start: 2025-09-25

## 2025-08-28 RX ORDER — ALBUTEROL SULFATE 90 UG/1
1-2 INHALANT RESPIRATORY (INHALATION)
Start: 2025-09-25

## 2025-08-28 RX ORDER — DIPHENHYDRAMINE HYDROCHLORIDE 50 MG/ML
25 INJECTION, SOLUTION INTRAMUSCULAR; INTRAVENOUS
Start: 2025-09-25

## 2025-08-28 RX ORDER — METHYLPREDNISOLONE SODIUM SUCCINATE 40 MG/ML
40 INJECTION INTRAMUSCULAR; INTRAVENOUS
Start: 2025-09-25

## 2025-08-28 RX ORDER — EPINEPHRINE 1 MG/ML
0.3 INJECTION, SOLUTION, CONCENTRATE INTRAVENOUS EVERY 5 MIN PRN
OUTPATIENT
Start: 2025-09-25

## 2025-08-28 RX ORDER — ALBUTEROL SULFATE 0.83 MG/ML
2.5 SOLUTION RESPIRATORY (INHALATION)
OUTPATIENT
Start: 2025-09-25

## 2025-08-28 RX ADMIN — Medication 600 MG: at 14:17
